# Patient Record
Sex: FEMALE | Race: WHITE | NOT HISPANIC OR LATINO
[De-identification: names, ages, dates, MRNs, and addresses within clinical notes are randomized per-mention and may not be internally consistent; named-entity substitution may affect disease eponyms.]

---

## 2018-07-17 ENCOUNTER — APPOINTMENT (OUTPATIENT)
Dept: SURGERY | Facility: CLINIC | Age: 63
End: 2018-07-17
Payer: SELF-PAY

## 2018-07-17 VITALS
BODY MASS INDEX: 58.29 KG/M2 | WEIGHT: 293 LBS | HEIGHT: 59.5 IN | SYSTOLIC BLOOD PRESSURE: 142 MMHG | DIASTOLIC BLOOD PRESSURE: 86 MMHG

## 2018-07-17 PROCEDURE — SI006: CPT

## 2018-08-17 ENCOUNTER — APPOINTMENT (OUTPATIENT)
Dept: SURGERY | Facility: CLINIC | Age: 63
End: 2018-08-17
Payer: MEDICARE

## 2018-08-17 VITALS
SYSTOLIC BLOOD PRESSURE: 138 MMHG | WEIGHT: 293 LBS | HEIGHT: 59.5 IN | BODY MASS INDEX: 58.29 KG/M2 | DIASTOLIC BLOOD PRESSURE: 84 MMHG

## 2018-08-17 DIAGNOSIS — Z80.41 FAMILY HISTORY OF MALIGNANT NEOPLASM OF OVARY: ICD-10-CM

## 2018-08-17 DIAGNOSIS — Z82.49 FAMILY HISTORY OF ISCHEMIC HEART DISEASE AND OTHER DISEASES OF THE CIRCULATORY SYSTEM: ICD-10-CM

## 2018-08-17 DIAGNOSIS — Z87.891 PERSONAL HISTORY OF NICOTINE DEPENDENCE: ICD-10-CM

## 2018-08-17 DIAGNOSIS — Z87.42 PERSONAL HISTORY OF OTHER DISEASES OF THE FEMALE GENITAL TRACT: ICD-10-CM

## 2018-08-17 DIAGNOSIS — Z87.01 PERSONAL HISTORY OF PNEUMONIA (RECURRENT): ICD-10-CM

## 2018-08-17 PROCEDURE — 99204 OFFICE O/P NEW MOD 45 MIN: CPT

## 2018-08-28 ENCOUNTER — APPOINTMENT (OUTPATIENT)
Dept: SURGERY | Facility: CLINIC | Age: 63
End: 2018-08-28
Payer: MEDICARE

## 2018-08-28 VITALS — WEIGHT: 293 LBS | BODY MASS INDEX: 58.29 KG/M2 | HEIGHT: 59.5 IN

## 2018-08-28 PROCEDURE — 97802 MEDICAL NUTRITION INDIV IN: CPT

## 2018-09-25 ENCOUNTER — APPOINTMENT (OUTPATIENT)
Dept: SURGERY | Facility: CLINIC | Age: 63
End: 2018-09-25
Payer: MEDICARE

## 2018-09-25 VITALS — BODY MASS INDEX: 58.17 KG/M2 | HEIGHT: 59.5 IN | WEIGHT: 292.4 LBS

## 2018-09-25 PROCEDURE — 99212 OFFICE O/P EST SF 10 MIN: CPT

## 2018-10-14 ENCOUNTER — FORM ENCOUNTER (OUTPATIENT)
Age: 63
End: 2018-10-14

## 2018-10-15 ENCOUNTER — OUTPATIENT (OUTPATIENT)
Dept: OUTPATIENT SERVICES | Facility: HOSPITAL | Age: 63
LOS: 1 days | Discharge: HOME | End: 2018-10-15

## 2018-10-15 DIAGNOSIS — E66.01 MORBID (SEVERE) OBESITY DUE TO EXCESS CALORIES: ICD-10-CM

## 2018-10-22 ENCOUNTER — RESULT REVIEW (OUTPATIENT)
Age: 63
End: 2018-10-22

## 2018-10-23 ENCOUNTER — APPOINTMENT (OUTPATIENT)
Dept: SURGERY | Facility: CLINIC | Age: 63
End: 2018-10-23
Payer: MEDICARE

## 2018-10-23 VITALS — BODY MASS INDEX: 58.09 KG/M2 | HEIGHT: 59.5 IN | WEIGHT: 292 LBS

## 2018-10-23 DIAGNOSIS — Z00.00 ENCOUNTER FOR GENERAL ADULT MEDICAL EXAMINATION W/OUT ABNORMAL FINDINGS: ICD-10-CM

## 2018-10-23 PROCEDURE — 99212 OFFICE O/P EST SF 10 MIN: CPT

## 2018-11-12 ENCOUNTER — RESULT REVIEW (OUTPATIENT)
Age: 63
End: 2018-11-12

## 2018-11-20 ENCOUNTER — APPOINTMENT (OUTPATIENT)
Dept: SURGERY | Facility: CLINIC | Age: 63
End: 2018-11-20
Payer: MEDICARE

## 2018-11-20 ENCOUNTER — OUTPATIENT (OUTPATIENT)
Dept: OUTPATIENT SERVICES | Facility: HOSPITAL | Age: 63
LOS: 1 days | Discharge: HOME | End: 2018-11-20

## 2018-11-20 VITALS — BODY MASS INDEX: 57.85 KG/M2 | WEIGHT: 290.8 LBS | HEIGHT: 59.5 IN

## 2018-11-20 DIAGNOSIS — E66.01 MORBID (SEVERE) OBESITY DUE TO EXCESS CALORIES: ICD-10-CM

## 2018-11-20 PROCEDURE — 99212 OFFICE O/P EST SF 10 MIN: CPT

## 2018-11-26 ENCOUNTER — OTHER (OUTPATIENT)
Age: 63
End: 2018-11-26

## 2018-12-18 ENCOUNTER — APPOINTMENT (OUTPATIENT)
Dept: SURGERY | Facility: CLINIC | Age: 63
End: 2018-12-18
Payer: MEDICARE

## 2018-12-18 VITALS — WEIGHT: 293 LBS | BODY MASS INDEX: 58.29 KG/M2 | HEIGHT: 59.5 IN

## 2018-12-18 PROCEDURE — 99212 OFFICE O/P EST SF 10 MIN: CPT

## 2019-01-24 ENCOUNTER — APPOINTMENT (OUTPATIENT)
Dept: SURGERY | Facility: CLINIC | Age: 64
End: 2019-01-24
Payer: MEDICARE

## 2019-01-24 VITALS — BODY MASS INDEX: 57.77 KG/M2 | WEIGHT: 290.4 LBS | HEIGHT: 59.5 IN

## 2019-01-24 PROCEDURE — 99212 OFFICE O/P EST SF 10 MIN: CPT

## 2019-02-02 ENCOUNTER — OUTPATIENT (OUTPATIENT)
Dept: OUTPATIENT SERVICES | Facility: HOSPITAL | Age: 64
LOS: 1 days | Discharge: HOME | End: 2019-02-02

## 2019-02-02 DIAGNOSIS — R91.8 OTHER NONSPECIFIC ABNORMAL FINDING OF LUNG FIELD: ICD-10-CM

## 2019-02-05 ENCOUNTER — APPOINTMENT (OUTPATIENT)
Dept: CARDIOLOGY | Facility: CLINIC | Age: 64
End: 2019-02-05

## 2019-02-05 ENCOUNTER — NON-APPOINTMENT (OUTPATIENT)
Age: 64
End: 2019-02-05

## 2019-02-05 VITALS
SYSTOLIC BLOOD PRESSURE: 142 MMHG | DIASTOLIC BLOOD PRESSURE: 84 MMHG | HEART RATE: 92 BPM | BODY MASS INDEX: 57.13 KG/M2 | WEIGHT: 291 LBS | HEIGHT: 60 IN

## 2019-02-18 ENCOUNTER — FORM ENCOUNTER (OUTPATIENT)
Age: 64
End: 2019-02-18

## 2019-02-19 ENCOUNTER — OUTPATIENT (OUTPATIENT)
Dept: OUTPATIENT SERVICES | Facility: HOSPITAL | Age: 64
LOS: 1 days | Discharge: HOME | End: 2019-02-19

## 2019-02-19 DIAGNOSIS — Z01.810 ENCOUNTER FOR PREPROCEDURAL CARDIOVASCULAR EXAMINATION: ICD-10-CM

## 2019-02-19 DIAGNOSIS — E66.01 MORBID (SEVERE) OBESITY DUE TO EXCESS CALORIES: ICD-10-CM

## 2019-02-19 DIAGNOSIS — E11.8 TYPE 2 DIABETES MELLITUS WITH UNSPECIFIED COMPLICATIONS: ICD-10-CM

## 2019-02-23 ENCOUNTER — APPOINTMENT (OUTPATIENT)
Dept: CARDIOLOGY | Facility: CLINIC | Age: 64
End: 2019-02-23

## 2019-03-03 NOTE — DISCUSSION/SUMMARY
[Procedure Intermediate Risk] : the procedure risk is intermediate [Additional Diagnostics Recommended] : additional diagnostics recommended [As per surgery] : as per surgery [Continue] : Continue medications as currently directed [FreeTextEntry1] : ECHO AND STRESS TEST

## 2019-03-03 NOTE — HISTORY OF PRESENT ILLNESS
[Preoperative Visit] : for a medical evaluation prior to surgery [Scheduled Procedure ___] : a [unfilled] [Surgeon Name ___] : surgeon: [unfilled] [Stable] : Stable [FreeTextEntry1] : Multiple cardiac risk factors\par \par +CONRAD with minimal activity. Denies any recent chest pain.\par \par Unable to assess metabolic capacity - Limited mobility due to extensive DJD and chronic left knee pain\par \par No prior cardiac workup

## 2019-03-03 NOTE — ADDENDUM
[FreeTextEntry1] : Echo and stress test reviewed - See scanned reports\par \par Low-risk for intermediate-risk surgery\par

## 2019-05-17 ENCOUNTER — APPOINTMENT (OUTPATIENT)
Dept: SURGERY | Facility: CLINIC | Age: 64
End: 2019-05-17
Payer: MEDICARE

## 2019-05-17 VITALS — HEIGHT: 59.5 IN | BODY MASS INDEX: 57.57 KG/M2 | WEIGHT: 289.4 LBS

## 2019-05-17 PROCEDURE — 99213 OFFICE O/P EST LOW 20 MIN: CPT

## 2019-05-20 RX ORDER — RABEPRAZOLE SODIUM 20 MG/1
20 TABLET, DELAYED RELEASE ORAL DAILY
Refills: 0 | Status: ACTIVE | COMMUNITY

## 2019-05-24 NOTE — ASSESSMENT
[FreeTextEntry1] : Patient seen for discussion of EGD results with pathologic confirmation of Junior's Esophagus.  As patient is desirous to have the Sleeve Gastrectomy, these endoscopic findings are concerning and in my opinion, a contraindication.  Issues surrounding Junior's and its potential progression as well as the risk of the Sleeve worsening reflux/Junior's was discussed with the patient and her . The option of a gastric bypass was discussed with the patient who is not interested in this choice at this time.  She was somewhat frustrated by the information and asked about seeing another GI physician for management of the Junior's.  As the patient is not an appropriate candidate for the Sleeve and the fact that she is not interested in the Gastric Bypass, we have no further plan at this time.  She will need time to consider the current options. Patient reports that she will also speak with the GI physician from whom she did not think the diagnosis would affect her surgical progress. The patient was obviously disappointed with this new information. Questions were answered.

## 2019-05-24 NOTE — REASON FOR VISIT
[Morbid Obesity (BMI>40)] : morbid obesity (bmi>40) [Follow-Up Visit] : a follow-up visit for [GERD] : gerd [Other: _____] : [unfilled] [FreeTextEntry2] : Junior's Esophagus

## 2019-06-20 ENCOUNTER — FORM ENCOUNTER (OUTPATIENT)
Age: 64
End: 2019-06-20

## 2019-06-21 ENCOUNTER — OUTPATIENT (OUTPATIENT)
Dept: OUTPATIENT SERVICES | Facility: HOSPITAL | Age: 64
LOS: 1 days | Discharge: HOME | End: 2019-06-21
Payer: MEDICARE

## 2019-06-21 DIAGNOSIS — E66.01 MORBID (SEVERE) OBESITY DUE TO EXCESS CALORIES: ICD-10-CM

## 2019-06-21 PROCEDURE — 78264 GASTRIC EMPTYING IMG STUDY: CPT | Mod: 26

## 2019-06-28 ENCOUNTER — OUTPATIENT (OUTPATIENT)
Dept: OUTPATIENT SERVICES | Facility: HOSPITAL | Age: 64
LOS: 1 days | Discharge: HOME | End: 2019-06-28
Payer: MEDICARE

## 2019-06-28 PROCEDURE — 74247: CPT | Mod: 26

## 2019-07-10 DIAGNOSIS — K31.84 GASTROPARESIS: ICD-10-CM

## 2019-07-10 DIAGNOSIS — E66.9 OBESITY, UNSPECIFIED: ICD-10-CM

## 2019-09-11 ENCOUNTER — APPOINTMENT (OUTPATIENT)
Dept: SURGERY | Facility: CLINIC | Age: 64
End: 2019-09-11
Payer: MEDICARE

## 2019-09-11 VITALS
SYSTOLIC BLOOD PRESSURE: 138 MMHG | BODY MASS INDEX: 57.34 KG/M2 | HEIGHT: 59.5 IN | WEIGHT: 288.2 LBS | DIASTOLIC BLOOD PRESSURE: 86 MMHG

## 2019-09-11 PROCEDURE — 99215 OFFICE O/P EST HI 40 MIN: CPT

## 2019-09-13 NOTE — HISTORY OF PRESENT ILLNESS
[de-identified] : Patient was diagnosed with Junior's esophagus and has decided to proceed with the Laparoscopic Gastric Bypass. The patient states that she has been overweight for several years and has tried multiple weight loss modalities in the past without any long-term sustainable weight loss.\par \par

## 2019-09-13 NOTE — REVIEW OF SYSTEMS
[Muscle Pain] : muscle pain [Joint Stiffness] : joint stiffness [Negative] : Allergic/Immunologic [FreeTextEntry9] : back

## 2019-09-13 NOTE — REASON FOR VISIT
[Follow-Up Visit] : a follow-up visit for [Morbid Obesity (BMI>40)] : morbid obesity (bmi>40) [FreeTextEntry2] : Patient presents for followup visit to discuss desirous surgical procedure.

## 2019-09-13 NOTE — ASSESSMENT
[FreeTextEntry1] : CARLENE SOLOMON is a 64 year female seen today for Bariatric follow up visit. She will be scheduled for the gastric bypass.  The surgical options for weight loss have been extensively discussed with the patient and questions answered.  Risks, including but not limited to:  anesthesia, death, bleeding, infection, leaks, blood clots, ulcers, malnutrition and need for additional operations have been reviewed.  \par \par

## 2019-09-13 NOTE — PLAN
[FreeTextEntry1] : PLAN:  Schedule surgery.\par             Liquid diet.\par             Call with concerns.

## 2019-09-16 ENCOUNTER — CLINICAL ADVICE (OUTPATIENT)
Age: 64
End: 2019-09-16

## 2019-09-20 ENCOUNTER — OTHER (OUTPATIENT)
Age: 64
End: 2019-09-20

## 2019-10-01 ENCOUNTER — EMERGENCY (EMERGENCY)
Facility: HOSPITAL | Age: 64
LOS: 0 days | Discharge: HOME | End: 2019-10-02
Attending: EMERGENCY MEDICINE | Admitting: EMERGENCY MEDICINE
Payer: MEDICARE

## 2019-10-01 ENCOUNTER — OUTPATIENT (OUTPATIENT)
Dept: OUTPATIENT SERVICES | Facility: HOSPITAL | Age: 64
LOS: 1 days | Discharge: HOME | End: 2019-10-01
Payer: COMMERCIAL

## 2019-10-01 VITALS
DIASTOLIC BLOOD PRESSURE: 81 MMHG | HEART RATE: 72 BPM | OXYGEN SATURATION: 99 % | SYSTOLIC BLOOD PRESSURE: 165 MMHG | RESPIRATION RATE: 18 BRPM | TEMPERATURE: 97 F

## 2019-10-01 VITALS
OXYGEN SATURATION: 96 % | DIASTOLIC BLOOD PRESSURE: 92 MMHG | TEMPERATURE: 96 F | HEIGHT: 60 IN | HEART RATE: 76 BPM | SYSTOLIC BLOOD PRESSURE: 142 MMHG | WEIGHT: 289.03 LBS | RESPIRATION RATE: 18 BRPM

## 2019-10-01 VITALS
RESPIRATION RATE: 18 BRPM | OXYGEN SATURATION: 98 % | HEART RATE: 89 BPM | SYSTOLIC BLOOD PRESSURE: 154 MMHG | TEMPERATURE: 98 F | DIASTOLIC BLOOD PRESSURE: 84 MMHG

## 2019-10-01 DIAGNOSIS — Z01.818 ENCOUNTER FOR OTHER PREPROCEDURAL EXAMINATION: ICD-10-CM

## 2019-10-01 DIAGNOSIS — E66.01 MORBID (SEVERE) OBESITY DUE TO EXCESS CALORIES: ICD-10-CM

## 2019-10-01 DIAGNOSIS — Z87.891 PERSONAL HISTORY OF NICOTINE DEPENDENCE: ICD-10-CM

## 2019-10-01 DIAGNOSIS — Z91.013 ALLERGY TO SEAFOOD: ICD-10-CM

## 2019-10-01 DIAGNOSIS — Z98.890 OTHER SPECIFIED POSTPROCEDURAL STATES: Chronic | ICD-10-CM

## 2019-10-01 DIAGNOSIS — Z90.710 ACQUIRED ABSENCE OF BOTH CERVIX AND UTERUS: Chronic | ICD-10-CM

## 2019-10-01 DIAGNOSIS — Z88.0 ALLERGY STATUS TO PENICILLIN: ICD-10-CM

## 2019-10-01 DIAGNOSIS — Z88.2 ALLERGY STATUS TO SULFONAMIDES: ICD-10-CM

## 2019-10-01 DIAGNOSIS — R79.9 ABNORMAL FINDING OF BLOOD CHEMISTRY, UNSPECIFIED: ICD-10-CM

## 2019-10-01 LAB
ALBUMIN SERPL ELPH-MCNC: 4 G/DL — SIGNIFICANT CHANGE UP (ref 3.5–5.2)
ALP SERPL-CCNC: 88 U/L — SIGNIFICANT CHANGE UP (ref 30–115)
ALT FLD-CCNC: 18 U/L — SIGNIFICANT CHANGE UP (ref 0–41)
ANION GAP SERPL CALC-SCNC: 16 MMOL/L — HIGH (ref 7–14)
APTT BLD: 36.1 SEC — SIGNIFICANT CHANGE UP (ref 27–39.2)
AST SERPL-CCNC: 17 U/L — SIGNIFICANT CHANGE UP (ref 0–41)
BASE EXCESS BLDV CALC-SCNC: 5.1 MMOL/L — HIGH (ref -2–2)
BASOPHILS # BLD AUTO: 0.04 K/UL — SIGNIFICANT CHANGE UP (ref 0–0.2)
BASOPHILS NFR BLD AUTO: 0.6 % — SIGNIFICANT CHANGE UP (ref 0–1)
BILIRUB SERPL-MCNC: 0.3 MG/DL — SIGNIFICANT CHANGE UP (ref 0.2–1.2)
BLD GP AB SCN SERPL QL: SIGNIFICANT CHANGE UP
BUN SERPL-MCNC: 14 MG/DL — SIGNIFICANT CHANGE UP (ref 10–20)
CA-I SERPL-SCNC: 1.15 MMOL/L — SIGNIFICANT CHANGE UP (ref 1.12–1.3)
CALCIUM SERPL-MCNC: 6.4 MG/DL — LOW (ref 8.5–10.1)
CHLORIDE SERPL-SCNC: 98 MMOL/L — SIGNIFICANT CHANGE UP (ref 98–110)
CO2 SERPL-SCNC: 26 MMOL/L — SIGNIFICANT CHANGE UP (ref 17–32)
CREAT SERPL-MCNC: 0.6 MG/DL — LOW (ref 0.7–1.5)
EOSINOPHIL # BLD AUTO: 0.16 K/UL — SIGNIFICANT CHANGE UP (ref 0–0.7)
EOSINOPHIL NFR BLD AUTO: 2.2 % — SIGNIFICANT CHANGE UP (ref 0–8)
ESTIMATED AVERAGE GLUCOSE: 123 MG/DL — HIGH (ref 68–114)
GAS PNL BLDV: 139 MMOL/L — SIGNIFICANT CHANGE UP (ref 136–145)
GAS PNL BLDV: SIGNIFICANT CHANGE UP
GLUCOSE SERPL-MCNC: 83 MG/DL — SIGNIFICANT CHANGE UP (ref 70–99)
HBA1C BLD-MCNC: 5.9 % — HIGH (ref 4–5.6)
HCO3 BLDV-SCNC: 30 MMOL/L — HIGH (ref 22–29)
HCT VFR BLD CALC: 36.7 % — LOW (ref 37–47)
HCT VFR BLDA CALC: 36.2 % — SIGNIFICANT CHANGE UP (ref 34–44)
HGB BLD CALC-MCNC: 11.8 G/DL — LOW (ref 14–18)
HGB BLD-MCNC: 11.5 G/DL — LOW (ref 12–16)
IMM GRANULOCYTES NFR BLD AUTO: 0.4 % — HIGH (ref 0.1–0.3)
INR BLD: 1 RATIO — SIGNIFICANT CHANGE UP (ref 0.65–1.3)
LACTATE BLDV-MCNC: 1.4 MMOL/L — SIGNIFICANT CHANGE UP (ref 0.5–1.6)
LYMPHOCYTES # BLD AUTO: 1.14 K/UL — LOW (ref 1.2–3.4)
LYMPHOCYTES # BLD AUTO: 15.8 % — LOW (ref 20.5–51.1)
MCHC RBC-ENTMCNC: 28.3 PG — SIGNIFICANT CHANGE UP (ref 27–31)
MCHC RBC-ENTMCNC: 31.3 G/DL — LOW (ref 32–37)
MCV RBC AUTO: 90.2 FL — SIGNIFICANT CHANGE UP (ref 81–99)
MONOCYTES # BLD AUTO: 0.38 K/UL — SIGNIFICANT CHANGE UP (ref 0.1–0.6)
MONOCYTES NFR BLD AUTO: 5.3 % — SIGNIFICANT CHANGE UP (ref 1.7–9.3)
NEUTROPHILS # BLD AUTO: 5.48 K/UL — SIGNIFICANT CHANGE UP (ref 1.4–6.5)
NEUTROPHILS NFR BLD AUTO: 75.7 % — HIGH (ref 42.2–75.2)
NRBC # BLD: 0 /100 WBCS — SIGNIFICANT CHANGE UP (ref 0–0)
PCO2 BLDV: 42 MMHG — SIGNIFICANT CHANGE UP (ref 41–51)
PH BLDV: 7.45 — HIGH (ref 7.26–7.43)
PLATELET # BLD AUTO: 202 K/UL — SIGNIFICANT CHANGE UP (ref 130–400)
PO2 BLDV: 79 MMHG — HIGH (ref 20–40)
POTASSIUM BLDV-SCNC: 4.3 MMOL/L — SIGNIFICANT CHANGE UP (ref 3.3–5.6)
POTASSIUM SERPL-MCNC: 6.4 MMOL/L — CRITICAL HIGH (ref 3.5–5)
POTASSIUM SERPL-SCNC: 6.4 MMOL/L — CRITICAL HIGH (ref 3.5–5)
PROT SERPL-MCNC: 7.2 G/DL — SIGNIFICANT CHANGE UP (ref 6–8)
PROTHROM AB SERPL-ACNC: 11.5 SEC — SIGNIFICANT CHANGE UP (ref 9.95–12.87)
RBC # BLD: 4.07 M/UL — LOW (ref 4.2–5.4)
RBC # FLD: 14.2 % — SIGNIFICANT CHANGE UP (ref 11.5–14.5)
SAO2 % BLDV: 97 % — SIGNIFICANT CHANGE UP
SODIUM SERPL-SCNC: 140 MMOL/L — SIGNIFICANT CHANGE UP (ref 135–146)
WBC # BLD: 7.23 K/UL — SIGNIFICANT CHANGE UP (ref 4.8–10.8)
WBC # FLD AUTO: 7.23 K/UL — SIGNIFICANT CHANGE UP (ref 4.8–10.8)

## 2019-10-01 PROCEDURE — 71046 X-RAY EXAM CHEST 2 VIEWS: CPT | Mod: 26

## 2019-10-01 PROCEDURE — 93010 ELECTROCARDIOGRAM REPORT: CPT

## 2019-10-01 PROCEDURE — 99284 EMERGENCY DEPT VISIT MOD MDM: CPT

## 2019-10-01 NOTE — ED PROVIDER NOTE - PATIENT PORTAL LINK FT
You can access the FollowMyHealth Patient Portal offered by [facility] by registering at the following website: [email or web address]. By joining [facility]’s FollowMyHealth portal, you will also be able to view your health information using other applications (apps) compatible with our system.

## 2019-10-01 NOTE — ED PROVIDER NOTE - CARE PLAN
Assessment and plan of treatment:	Likely hemolyzed K, no EKG changes from today, will get VBG and reassess Principal Discharge DX:	Abnormal laboratory test  Assessment and plan of treatment:	Likely hemolyzed K, no EKG changes from today, will get VBG and reassess

## 2019-10-01 NOTE — ED PROVIDER NOTE - NSFOLLOWUPINSTRUCTIONS_ED_ALL_ED_FT
YOU WERE SENT FOR REPEAT LABS AFTER YOUR POTASSIUM WAS FOUND TO BE ELEVATED. REPEAT BLOOD WORK WAS NORMAL. A COPY OF YOUR LABS WAS PROVIDED.

## 2019-10-01 NOTE — H&P PST ADULT - NSICDXPASTMEDICALHX_GEN_ALL_CORE_FT
PAST MEDICAL HISTORY:  Asthma     Barretts esophagus     DM (diabetes mellitus)     GERD (gastroesophageal reflux disease)     Obesity

## 2019-10-01 NOTE — ED ADULT NURSE NOTE - OBJECTIVE STATEMENT
Pt was seen earlier and D/C home around 4pm, but was contacted by her PMD at 9pm Dr Gil to come back for evaluation due to abnormal potassium level. Pt denies any pain ands states she feels fine.

## 2019-10-01 NOTE — ED ADULT NURSE NOTE - PMH
Asthma    Barretts esophagus    DM (diabetes mellitus)    GERD (gastroesophageal reflux disease)    Obesity

## 2019-10-01 NOTE — ED PROVIDER NOTE - OBJECTIVE STATEMENT
65 yo F, history of DM II, HLD, GERD, asthma, pending bariatric surgery here for assessment of hyperkalemia on pre-op testing. No complaints, has normal Cr, no signs of hyperkalemia on pre op EKG.

## 2019-10-01 NOTE — ED PROVIDER NOTE - PHYSICAL EXAMINATION
VITAL SIGNS: I have reviewed nursing notes and confirm.  CONSTITUTIONAL: Well-developed; well-nourished; in no acute distress.  SKIN: Skin exam is warm and dry, no acute rash.  HEAD: Normocephalic; atraumatic.  CARD: RRR, no murmur  RESP: No wheezes, rales or rhonchi.  ABD: Normal bowel sounds; soft; non-distended; non-tender  EXT: Normal ROM. No clubbing, cyanosis or edema.  NEURO: Alert, oriented. Grossly unremarkable. No focal deficits.  PSYCH: Cooperative, appropriate.

## 2019-10-02 PROBLEM — E11.9 TYPE 2 DIABETES MELLITUS WITHOUT COMPLICATIONS: Chronic | Status: ACTIVE | Noted: 2019-10-01

## 2019-10-02 PROBLEM — K22.70 BARRETT'S ESOPHAGUS WITHOUT DYSPLASIA: Chronic | Status: ACTIVE | Noted: 2019-10-01

## 2019-10-02 PROBLEM — K21.9 GASTRO-ESOPHAGEAL REFLUX DISEASE WITHOUT ESOPHAGITIS: Chronic | Status: ACTIVE | Noted: 2019-10-01

## 2019-10-02 PROBLEM — E66.9 OBESITY, UNSPECIFIED: Chronic | Status: ACTIVE | Noted: 2019-10-01

## 2019-10-02 PROBLEM — J45.909 UNSPECIFIED ASTHMA, UNCOMPLICATED: Chronic | Status: ACTIVE | Noted: 2019-10-01

## 2019-10-07 ENCOUNTER — CLINICAL ADVICE (OUTPATIENT)
Age: 64
End: 2019-10-07

## 2019-10-08 RX ORDER — SITAGLIPTIN 100 MG/1
100 TABLET, FILM COATED ORAL DAILY
Refills: 0 | Status: COMPLETED | COMMUNITY
End: 2019-10-08

## 2019-10-08 RX ORDER — VALSARTAN 40 MG/1
40 TABLET ORAL DAILY
Refills: 0 | Status: ACTIVE | COMMUNITY

## 2019-10-15 ENCOUNTER — APPOINTMENT (OUTPATIENT)
Dept: SURGERY | Facility: HOSPITAL | Age: 64
End: 2019-10-15
Payer: MEDICARE

## 2019-10-15 ENCOUNTER — INPATIENT (INPATIENT)
Facility: HOSPITAL | Age: 64
LOS: 1 days | Discharge: HOME | End: 2019-10-17
Attending: SURGERY | Admitting: SURGERY
Payer: MEDICARE

## 2019-10-15 VITALS
HEIGHT: 60 IN | HEART RATE: 100 BPM | DIASTOLIC BLOOD PRESSURE: 96 MMHG | RESPIRATION RATE: 20 BRPM | WEIGHT: 274.92 LBS | SYSTOLIC BLOOD PRESSURE: 138 MMHG | TEMPERATURE: 98 F

## 2019-10-15 DIAGNOSIS — Z98.890 OTHER SPECIFIED POSTPROCEDURAL STATES: Chronic | ICD-10-CM

## 2019-10-15 DIAGNOSIS — Z90.710 ACQUIRED ABSENCE OF BOTH CERVIX AND UTERUS: Chronic | ICD-10-CM

## 2019-10-15 LAB
ABO RH CONFIRMATION: SIGNIFICANT CHANGE UP
GLUCOSE BLDC GLUCOMTR-MCNC: 129 MG/DL — HIGH (ref 70–99)
GLUCOSE BLDC GLUCOMTR-MCNC: 156 MG/DL — HIGH (ref 70–99)
HCT VFR BLD CALC: 37.1 % — SIGNIFICANT CHANGE UP (ref 37–47)
HGB BLD-MCNC: 11.9 G/DL — LOW (ref 12–16)
MCHC RBC-ENTMCNC: 29 PG — SIGNIFICANT CHANGE UP (ref 27–31)
MCHC RBC-ENTMCNC: 32.1 G/DL — SIGNIFICANT CHANGE UP (ref 32–37)
MCV RBC AUTO: 90.5 FL — SIGNIFICANT CHANGE UP (ref 81–99)
NRBC # BLD: 0 /100 WBCS — SIGNIFICANT CHANGE UP (ref 0–0)
PLATELET # BLD AUTO: 207 K/UL — SIGNIFICANT CHANGE UP (ref 130–400)
RBC # BLD: 4.1 M/UL — LOW (ref 4.2–5.4)
RBC # FLD: 14.2 % — SIGNIFICANT CHANGE UP (ref 11.5–14.5)
WBC # BLD: 14.14 K/UL — HIGH (ref 4.8–10.8)
WBC # FLD AUTO: 14.14 K/UL — HIGH (ref 4.8–10.8)

## 2019-10-15 PROCEDURE — 43644 LAP GASTRIC BYPASS/ROUX-EN-Y: CPT | Mod: 82

## 2019-10-15 PROCEDURE — 99233 SBSQ HOSP IP/OBS HIGH 50: CPT

## 2019-10-15 PROCEDURE — 43644 LAP GASTRIC BYPASS/ROUX-EN-Y: CPT

## 2019-10-15 RX ORDER — LABETALOL HCL 100 MG
10 TABLET ORAL
Refills: 0 | Status: DISCONTINUED | OUTPATIENT
Start: 2019-10-15 | End: 2019-10-15

## 2019-10-15 RX ORDER — MORPHINE SULFATE 50 MG/1
30 CAPSULE, EXTENDED RELEASE ORAL
Refills: 0 | Status: DISCONTINUED | OUTPATIENT
Start: 2019-10-15 | End: 2019-10-17

## 2019-10-15 RX ORDER — ENOXAPARIN SODIUM 100 MG/ML
40 INJECTION SUBCUTANEOUS EVERY 12 HOURS
Refills: 0 | Status: DISCONTINUED | OUTPATIENT
Start: 2019-10-15 | End: 2019-10-17

## 2019-10-15 RX ORDER — SODIUM CHLORIDE 9 MG/ML
1000 INJECTION, SOLUTION INTRAVENOUS
Refills: 0 | Status: DISCONTINUED | OUTPATIENT
Start: 2019-10-15 | End: 2019-10-17

## 2019-10-15 RX ORDER — ONDANSETRON 8 MG/1
4 TABLET, FILM COATED ORAL EVERY 6 HOURS
Refills: 0 | Status: DISCONTINUED | OUTPATIENT
Start: 2019-10-15 | End: 2019-10-17

## 2019-10-15 RX ORDER — NALOXONE HYDROCHLORIDE 4 MG/.1ML
0.1 SPRAY NASAL
Refills: 0 | Status: DISCONTINUED | OUTPATIENT
Start: 2019-10-15 | End: 2019-10-17

## 2019-10-15 RX ORDER — ACETAMINOPHEN 500 MG
1000 TABLET ORAL ONCE
Refills: 0 | Status: DISCONTINUED | OUTPATIENT
Start: 2019-10-15 | End: 2019-10-16

## 2019-10-15 RX ORDER — MORPHINE SULFATE 50 MG/1
4 CAPSULE, EXTENDED RELEASE ORAL
Refills: 0 | Status: DISCONTINUED | OUTPATIENT
Start: 2019-10-15 | End: 2019-10-15

## 2019-10-15 RX ORDER — PANTOPRAZOLE SODIUM 20 MG/1
40 TABLET, DELAYED RELEASE ORAL DAILY
Refills: 0 | Status: DISCONTINUED | OUTPATIENT
Start: 2019-10-15 | End: 2019-10-17

## 2019-10-15 RX ORDER — DEXTROSE 50 % IN WATER 50 %
25 SYRINGE (ML) INTRAVENOUS ONCE
Refills: 0 | Status: DISCONTINUED | OUTPATIENT
Start: 2019-10-15 | End: 2019-10-17

## 2019-10-15 RX ORDER — GABAPENTIN 400 MG/1
125 CAPSULE ORAL EVERY 8 HOURS
Refills: 0 | Status: DISCONTINUED | OUTPATIENT
Start: 2019-10-15 | End: 2019-10-17

## 2019-10-15 RX ORDER — DEXTROSE 50 % IN WATER 50 %
12.5 SYRINGE (ML) INTRAVENOUS ONCE
Refills: 0 | Status: DISCONTINUED | OUTPATIENT
Start: 2019-10-15 | End: 2019-10-17

## 2019-10-15 RX ORDER — ACETAMINOPHEN 500 MG
1000 TABLET ORAL ONCE
Refills: 0 | Status: COMPLETED | OUTPATIENT
Start: 2019-10-15 | End: 2019-10-15

## 2019-10-15 RX ORDER — BUPIVACAINE 13.3 MG/ML
20 INJECTION, SUSPENSION, LIPOSOMAL INFILTRATION ONCE
Refills: 0 | Status: COMPLETED | OUTPATIENT
Start: 2019-10-15 | End: 2019-10-15

## 2019-10-15 RX ORDER — GLUCAGON INJECTION, SOLUTION 0.5 MG/.1ML
1 INJECTION, SOLUTION SUBCUTANEOUS ONCE
Refills: 0 | Status: DISCONTINUED | OUTPATIENT
Start: 2019-10-15 | End: 2019-10-17

## 2019-10-15 RX ORDER — HEPARIN SODIUM 5000 [USP'U]/ML
5000 INJECTION INTRAVENOUS; SUBCUTANEOUS ONCE
Refills: 0 | Status: COMPLETED | OUTPATIENT
Start: 2019-10-15 | End: 2019-10-15

## 2019-10-15 RX ORDER — INSULIN LISPRO 100/ML
VIAL (ML) SUBCUTANEOUS
Refills: 0 | Status: DISCONTINUED | OUTPATIENT
Start: 2019-10-15 | End: 2019-10-17

## 2019-10-15 RX ORDER — LABETALOL HCL 100 MG
10 TABLET ORAL ONCE
Refills: 0 | Status: DISCONTINUED | OUTPATIENT
Start: 2019-10-15 | End: 2019-10-15

## 2019-10-15 RX ORDER — MORPHINE SULFATE 50 MG/1
30 CAPSULE, EXTENDED RELEASE ORAL
Refills: 0 | Status: DISCONTINUED | OUTPATIENT
Start: 2019-10-15 | End: 2019-10-15

## 2019-10-15 RX ORDER — LABETALOL HCL 100 MG
10 TABLET ORAL ONCE
Refills: 0 | Status: COMPLETED | OUTPATIENT
Start: 2019-10-15 | End: 2019-10-15

## 2019-10-15 RX ORDER — DEXTROSE 50 % IN WATER 50 %
15 SYRINGE (ML) INTRAVENOUS ONCE
Refills: 0 | Status: DISCONTINUED | OUTPATIENT
Start: 2019-10-15 | End: 2019-10-17

## 2019-10-15 RX ORDER — SODIUM CHLORIDE 9 MG/ML
1000 INJECTION, SOLUTION INTRAVENOUS
Refills: 0 | Status: DISCONTINUED | OUTPATIENT
Start: 2019-10-15 | End: 2019-10-16

## 2019-10-15 RX ORDER — INSULIN DETEMIR 100/ML (3)
50 INSULIN PEN (ML) SUBCUTANEOUS
Qty: 0 | Refills: 0 | DISCHARGE

## 2019-10-15 RX ORDER — INSULIN LISPRO 100/ML
VIAL (ML) SUBCUTANEOUS
Refills: 0 | Status: DISCONTINUED | OUTPATIENT
Start: 2019-10-15 | End: 2019-10-15

## 2019-10-15 RX ORDER — HYDROMORPHONE HYDROCHLORIDE 2 MG/ML
0.5 INJECTION INTRAMUSCULAR; INTRAVENOUS; SUBCUTANEOUS
Refills: 0 | Status: DISCONTINUED | OUTPATIENT
Start: 2019-10-15 | End: 2019-10-15

## 2019-10-15 RX ORDER — KETOROLAC TROMETHAMINE 30 MG/ML
15 SYRINGE (ML) INJECTION EVERY 6 HOURS
Refills: 0 | Status: DISCONTINUED | OUTPATIENT
Start: 2019-10-15 | End: 2019-10-16

## 2019-10-15 RX ADMIN — Medication 1.25 MILLIGRAM(S): at 18:56

## 2019-10-15 RX ADMIN — PANTOPRAZOLE SODIUM 40 MILLIGRAM(S): 20 TABLET, DELAYED RELEASE ORAL at 19:38

## 2019-10-15 RX ADMIN — Medication 2: at 19:30

## 2019-10-15 RX ADMIN — Medication 400 MILLIGRAM(S): at 20:00

## 2019-10-15 RX ADMIN — Medication 15 MILLIGRAM(S): at 19:30

## 2019-10-15 RX ADMIN — HEPARIN SODIUM 5000 UNIT(S): 5000 INJECTION INTRAVENOUS; SUBCUTANEOUS at 12:38

## 2019-10-15 RX ADMIN — Medication 10 MILLIGRAM(S): at 20:18

## 2019-10-15 RX ADMIN — GABAPENTIN 125 MILLIGRAM(S): 400 CAPSULE ORAL at 22:41

## 2019-10-15 RX ADMIN — SODIUM CHLORIDE 125 MILLILITER(S): 9 INJECTION, SOLUTION INTRAVENOUS at 17:00

## 2019-10-15 RX ADMIN — MORPHINE SULFATE 30 MILLILITER(S): 50 CAPSULE, EXTENDED RELEASE ORAL at 17:43

## 2019-10-15 RX ADMIN — Medication 15 MILLIGRAM(S): at 19:45

## 2019-10-15 RX ADMIN — MORPHINE SULFATE 4 MILLIGRAM(S): 50 CAPSULE, EXTENDED RELEASE ORAL at 17:04

## 2019-10-15 RX ADMIN — ENOXAPARIN SODIUM 40 MILLIGRAM(S): 100 INJECTION SUBCUTANEOUS at 18:56

## 2019-10-15 NOTE — CHART NOTE - NSCHARTNOTEFT_GEN_A_CORE
SURGERY POST-OP NOTE:    S: Patient underwent Laparoscopic gastric bypass and tolerated procedure without issue and sent to PACU. Patient denies chest pain, shortness of breath, nausea, vomiting, lightheadedness, or dizziness. Pain was well controlled.      · Estimated Blood Loss	20 milliLiter(s)  · IV Infusions - Crystalloids	2100cc  · Urine Output	200 milliLiter(s)    O:  T(C): 36.7 (10-15-19 @ 19:00), Max: 36.7 (10-15-19 @ 19:00)  HR: 92 (10-15-19 @ 20:00) (68 - 92)  BP: 178/82 (10-15-19 @ 20:00) (154/88 - 181/92)  RR: 16 (10-15-19 @ 20:00) (14 - 22)  SpO2: 100% (10-15-19 @ 20:00) (95% - 100%)  Wt(kg): --         Gen: NAD  Resp: Non-labored respirations  Abd: Obese, soft, nondistended. Tenderness appropriate post-op  Incisions: clean dry and intact, no signs of bleeding or drainage   : Cain draining yellow urine    Assessment/Plan:  64y Female now POD#0 s/p Laparoscopic gastric bypass    - Pain control  - NPO  - Hemodynamic monitoring  - DVT PPX  - Early ambulation  - Incentive spirometry    Dispo: SICU

## 2019-10-15 NOTE — BRIEF OPERATIVE NOTE - BRIEF OP NOTE DRAINS
none

## 2019-10-15 NOTE — CHART NOTE - NSCHARTNOTEFT_GEN_A_CORE
PACU ANESTHESIA ADMISSION NOTE      Procedure: Laparoscopic gastric bypass    Post op diagnosis:  Morbid obesity      ____  Intubated  TV:______       Rate: ______      FiO2: ______    _x___  Patent Airway    _x___  Full return of protective reflexes    _x___  Full recovery from anesthesia / back to baseline status    Vitals:  T(F): 97.6   HR: 89  BP: 160/80  RR: 19  SpO2: 97%    Mental Status:  _x___ Awake   __x___ Alert   _____ Drowsy   _____ Sedated    Nausea/Vomiting:  _x___  NO       ______Yes,   See Post - Op Orders         Pain Scale (0-10):  __0___    Treatment: _x___ None    ____ See Post - Op/PCA Orders    Post - Operative Fluids:   __x__ Oral   ____ See Post - Op Orders    Plan: Discharge:   ___Home       _____Floor     _x____Critical Care    _____  Other:_________________    Comments:  No anesthesia issues or complications noted.  Discharge when criteria met.

## 2019-10-15 NOTE — CONSULT NOTE ADULT - SUBJECTIVE AND OBJECTIVE BOX
SICU Consultation Note  =====================================================  HPI: 64y Female w/ a PMH of DM, asthma, JESSICA, GERD, landis’s esophagus, and obesity w/ BMI 59.1 s/p elective RNY GBP with tap block today.     Uncomplicated procedure  Surgery Information  OR time: 2h     EBL: 20cc       IV Fluids:  2100     Blood Products: None  UOP:  200      PAST MEDICAL & SURGICAL HISTORY:  Barretts esophagus  GERD (gastroesophageal reflux disease)  Asthma  DM (diabetes mellitus)  Obesity  History of surgery: left knee  History of hysterectomy    Home Meds: Home Medications:  Aspir 81 oral delayed release tablet: 4 tab(s) orally once a day (15 Oct 2019 08:35)  Januvia 100 mg oral tablet: 1 tab(s) orally once a day (15 Oct 2019 08:35)  Levemir FlexPen 100 units/mL subcutaneous solution: 40 unit(s) subcutaneous once a day (at bedtime). 20 units 10/14/19 (15 Oct 2019 08:35)  metFORMIN 1000 mg oral tablet: 1 tab(s) orally 2 times a day (15 Oct 2019 08:35)  montelukast 10 mg oral tablet: 1 tab(s) orally once a day (15 Oct 2019 08:35)  Multiple Vitamins oral tablet: 1 tab(s) orally once a day (15 Oct 2019 08:35)  RABEprazole 20 mg oral delayed release tablet: 1 tab(s) orally once a day (15 Oct 2019 08:35)  simvastatin 10 mg oral tablet: 1 tab(s) orally once a day (at bedtime) (15 Oct 2019 08:35)  Vitamin B12:  (15 Oct 2019 08:35)  Vitamin D3:  (15 Oct 2019 08:35)    Allergies: Allergies    iodine (Rash)  penicillins (Anaphylaxis; Rash)  Seafood (Rash)  sulfa drugs (Anaphylaxis; Rash)    Intolerances      Soc:   Advanced Directives: Full Code     ROS:    REVIEW OF SYSTEMS    [x] A ten-point review of systems was otherwise negative except as noted.  [ ] Due to altered mental status/intubation, subjective information were not able to be obtained from the patient. History was obtained, to the extent possible, from review of the chart and collateral sources of information.      CURRENT MEDICATIONS:   --------------------------------------------------------------------------------------  Neurologic Medications  acetaminophen  IVPB .. 1000 milliGRAM(s) IV Intermittent once  acetaminophen  IVPB .. 1000 milliGRAM(s) IV Intermittent once  HYDROmorphone  Injectable 0.5 milliGRAM(s) IV Push every 10 minutes PRN Moderate Pain (4 - 6)  ketorolac   Injectable 15 milliGRAM(s) IV Push every 6 hours  morphine  - Injectable 4 milliGRAM(s) IV Push every 10 minutes PRN Severe Pain (7 - 10)  morphine PCA (5 mG/mL) 30 milliLiter(s) PCA Continuous PCA Continuous  ondansetron Injectable 4 milliGRAM(s) IV Push every 6 hours PRN Nausea  ondansetron Injectable 4 milliGRAM(s) IV Push every 6 hours PRN Nausea and/or Vomiting    Respiratory Medications    Cardiovascular Medications    Gastrointestinal Medications  dextrose 5%. 1000 milliLiter(s) IV Continuous <Continuous>  lactated ringers. 1000 milliLiter(s) IV Continuous <Continuous>  lactated ringers. 1000 milliLiter(s) IV Continuous <Continuous>  pantoprazole  Injectable 40 milliGRAM(s) IV Push daily    Genitourinary Medications    Hematologic/Oncologic Medications  enoxaparin Injectable 40 milliGRAM(s) SubCutaneous every 12 hours    Antimicrobial/Immunologic Medications    Endocrine/Metabolic Medications  dextrose 40% Gel 15 Gram(s) Oral once PRN Blood Glucose LESS THAN 70 milliGRAM(s)/deciliter  dextrose 50% Injectable 12.5 Gram(s) IV Push once  dextrose 50% Injectable 25 Gram(s) IV Push once  dextrose 50% Injectable 25 Gram(s) IV Push once  glucagon  Injectable 1 milliGRAM(s) IntraMuscular once PRN Glucose LESS THAN 70 milligrams/deciliter  insulin lispro (HumaLOG) corrective regimen sliding scale   SubCutaneous three times a day before meals    Topical/Other Medications  BUpivacaine liposome 1.3% Injectable 20 milliLiter(s) Local Injection once  naloxone Injectable 0.1 milliGRAM(s) IV Push every 3 minutes PRN For ANY of the following changes in patient status:  A. RR LESS THAN 10 breaths per minute, B. Oxygen saturation LESS THAN 90%, C. Sedation score of 6    --------------------------------------------------------------------------------------    VITAL SIGNS, INS/OUTS (last 24 hours):  --------------------------------------------------------------------------------------  ICU Vital Signs Last 24 Hrs  T(C): 36.4 (15 Oct 2019 16:13), Max: 36.5 (15 Oct 2019 08:37)  T(F): 97.6 (15 Oct 2019 16:13), Max: 97.7 (15 Oct 2019 08:37)  HR: 79 (15 Oct 2019 16:28) (78 - 100)  BP: 170/91 (15 Oct 2019 16:28) (138/96 - 170/91)  BP(mean): --  ABP: --  ABP(mean): --  RR: 15 (15 Oct 2019 16:28) (14 - 20)  SpO2: 98% (15 Oct 2019 16:28) (95% - 98%)    I&O's Summary    --------------------------------------------------------------------------------------    EXAM:  General/Neuro  RASS:   GCS:   Exam: Normal, NAD, alert, oriented x 3, no focal deficits. PERRLA  ***    Respiratory  Exam: Lungs clear to auscultation, Normal expansion/effort.  ***  [] Tracheostomy   [] Intubated  Mechanical Ventilation:     Cardiovascular  Exam: S1, S2.  Regular rate and rhythm.  Peripheral edema  ***  Cardiac Rhythm: Normal Sinus Rhythm  ECHO:     GI  Exam: Abdomen soft, Non-tender, Non-distended.  Gastrostomy / Jejunostomy tube in place.  Nasogastric tube in place.  Colostomy / Ileostomy.  ***  Wound:   ***  Current Diet:  NPO***      Tubes/Lines/Drains  ***  [x] Peripheral IV  [] Central Venous Line     	[] R	[] L	[] IJ	[] Fem	[] SC        Type:	    Date Placed:   [] Arterial Line		[] R	[] L	[] Fem	[] Rad	[] Ax	Date Placed:   [] PICC:         	[] Midline		[] Mediport           [] Urinary Catheter		Date Placed:     Extremities  Exam: Extremities warm, pink, well-perfused.        Derm:  Exam: Good skin turgor, no skin breakdown.      :   Exam: Cain catheter in place.     LABS  --------------------------------------------------------------------------------------  Labs:  CAPILLARY BLOOD GLUCOSE      POCT Blood Glucose.: 129 mg/dL (15 Oct 2019 08:31)                  LFTs:         Coags:                  --------------------------------------------------------------------------------------    OTHER LABS    IMAGING RESULTS      ASSESSMENT:  64y Female ***    PLAN:   Neurologic:   Respiratory:   Cardiovascular:   Gastrointestinal/Nutrition:   Renal/Genitourinary:   Hematologic:   Infectious Disease:   Lines/Tubes:  Endocrine:   Disposition:     --------------------------------------------------------------------------------------    Critical Care Diagnoses: SICU Consultation Note  =====================================================  HPI: 64y Female w/ a PMH of DM, asthma, JESSICA, GERD, landis’s esophagus, and obesity w/ BMI 59.1 s/p elective RNY GBP with tap block today.     Uncomplicated procedure  Surgery Information  OR time: 2h     EBL: 20cc       IV Fluids:  2100     Blood Products: None  UOP:  200    Postoperatively, pt c/o mild b/l UQ pain, sore in character, assoc w/ nausea, no vomiting. Pt denies CP, SOB, fever, chills, urinary symptoms.      PAST MEDICAL & SURGICAL HISTORY:  Barretts esophagus  GERD (gastroesophageal reflux disease)  Asthma  DM (diabetes mellitus)  Obesity  History of surgery: left knee  History of hysterectomy    Home Meds: Home Medications:  Aspir 81 oral delayed release tablet: 4 tab(s) orally once a day (15 Oct 2019 08:35)  Januvia 100 mg oral tablet: 1 tab(s) orally once a day (15 Oct 2019 08:35)  Levemir FlexPen 100 units/mL subcutaneous solution: 40 unit(s) subcutaneous once a day (at bedtime). 20 units 10/14/19 (15 Oct 2019 08:35)  metFORMIN 1000 mg oral tablet: 1 tab(s) orally 2 times a day (15 Oct 2019 08:35)  montelukast 10 mg oral tablet: 1 tab(s) orally once a day (15 Oct 2019 08:35)  Multiple Vitamins oral tablet: 1 tab(s) orally once a day (15 Oct 2019 08:35)  RABEprazole 20 mg oral delayed release tablet: 1 tab(s) orally once a day (15 Oct 2019 08:35)  simvastatin 10 mg oral tablet: 1 tab(s) orally once a day (at bedtime) (15 Oct 2019 08:35)  Vitamin B12:  (15 Oct 2019 08:35)  Vitamin D3:  (15 Oct 2019 08:35)    Allergies: Allergies    iodine (Rash)  penicillins (Anaphylaxis; Rash)  Seafood (Rash)  sulfa drugs (Anaphylaxis; Rash)    Intolerances      Soc:   Advanced Directives: Full Code     ROS:    REVIEW OF SYSTEMS    [x] A ten-point review of systems was otherwise negative except as noted.  [ ] Due to altered mental status/intubation, subjective information were not able to be obtained from the patient. History was obtained, to the extent possible, from review of the chart and collateral sources of information.      CURRENT MEDICATIONS:   --------------------------------------------------------------------------------------  Neurologic Medications  acetaminophen  IVPB .. 1000 milliGRAM(s) IV Intermittent once  acetaminophen  IVPB .. 1000 milliGRAM(s) IV Intermittent once  HYDROmorphone  Injectable 0.5 milliGRAM(s) IV Push every 10 minutes PRN Moderate Pain (4 - 6)  ketorolac   Injectable 15 milliGRAM(s) IV Push every 6 hours  morphine  - Injectable 4 milliGRAM(s) IV Push every 10 minutes PRN Severe Pain (7 - 10)  morphine PCA (5 mG/mL) 30 milliLiter(s) PCA Continuous PCA Continuous  ondansetron Injectable 4 milliGRAM(s) IV Push every 6 hours PRN Nausea  ondansetron Injectable 4 milliGRAM(s) IV Push every 6 hours PRN Nausea and/or Vomiting    Respiratory Medications    Cardiovascular Medications    Gastrointestinal Medications  dextrose 5%. 1000 milliLiter(s) IV Continuous <Continuous>  lactated ringers. 1000 milliLiter(s) IV Continuous <Continuous>  lactated ringers. 1000 milliLiter(s) IV Continuous <Continuous>  pantoprazole  Injectable 40 milliGRAM(s) IV Push daily    Genitourinary Medications    Hematologic/Oncologic Medications  enoxaparin Injectable 40 milliGRAM(s) SubCutaneous every 12 hours    Antimicrobial/Immunologic Medications    Endocrine/Metabolic Medications  dextrose 40% Gel 15 Gram(s) Oral once PRN Blood Glucose LESS THAN 70 milliGRAM(s)/deciliter  dextrose 50% Injectable 12.5 Gram(s) IV Push once  dextrose 50% Injectable 25 Gram(s) IV Push once  dextrose 50% Injectable 25 Gram(s) IV Push once  glucagon  Injectable 1 milliGRAM(s) IntraMuscular once PRN Glucose LESS THAN 70 milligrams/deciliter  insulin lispro (HumaLOG) corrective regimen sliding scale   SubCutaneous three times a day before meals    Topical/Other Medications  BUpivacaine liposome 1.3% Injectable 20 milliLiter(s) Local Injection once  naloxone Injectable 0.1 milliGRAM(s) IV Push every 3 minutes PRN For ANY of the following changes in patient status:  A. RR LESS THAN 10 breaths per minute, B. Oxygen saturation LESS THAN 90%, C. Sedation score of 6    --------------------------------------------------------------------------------------    VITAL SIGNS, INS/OUTS (last 24 hours):  --------------------------------------------------------------------------------------  ICU Vital Signs Last 24 Hrs  T(C): 36.4 (15 Oct 2019 16:13), Max: 36.5 (15 Oct 2019 08:37)  T(F): 97.6 (15 Oct 2019 16:13), Max: 97.7 (15 Oct 2019 08:37)  HR: 79 (15 Oct 2019 16:28) (78 - 100)  BP: 170/91 (15 Oct 2019 16:28) (138/96 - 170/91)  BP(mean): --  ABP: --  ABP(mean): --  RR: 15 (15 Oct 2019 16:28) (14 - 20)  SpO2: 98% (15 Oct 2019 16:28) (95% - 98%)    I&O's Summary    --------------------------------------------------------------------------------------    EXAM:  General/Neuro  Exam: Normal, NAD, lethargic, but alert & oriented x 3, no focal deficits. PERRLA, EOMI  Pain controlled    Respiratory  Exam: Lungs clear to auscultation, Normal expansion/effort.    Cardiovascular  Exam: S1, S2.  Regular rate and rhythm.    Cardiac Rhythm: Normal Sinus Rhythm    GI  Exam: Abdomen soft, Non-tender, Non-distended.  Obese  Incisions c/d/i  Current Diet:  NPO      Tubes/Lines/Drains   [x] Peripheral IV  [] Central Venous Line     	[] R	[] L	[] IJ	[] Fem	[] SC        Type:	    Date Placed:   [] Arterial Line		[] R	[] L	[] Fem	[] Rad	[] Ax	Date Placed:   [] PICC:         	[] Midline		[] Mediport           [] Urinary Catheter		Date Placed:     Extremities  Exam: Extremities warm, pink, well-perfused.      Derm:  Exam: Good skin turgor, no skin breakdown.      :   Exam: Cain catheter in place.     LABS  --------------------------------------------------------------------------------------  Labs:  CAPILLARY BLOOD GLUCOSE    POCT Blood Glucose.: 129 mg/dL (15 Oct 2019 08:31)    Full set of labs @ 23:30      --------------------------------------------------------------------------------------  IMAGING RESULTS    None at present

## 2019-10-15 NOTE — PRE-ANESTHESIA EVALUATION ADULT - NSANTHAPLANRD_GEN_ALL_CORE
Gen: No fever, normal appetite  Eyes: No eye irritation or discharge  ENT: No ear pain, congestion, sore throat  Resp: No cough or trouble breathing  Cardiovascular: No chest pain or palpitation  Gastroenteric: No nausea/vomiting, diarrhea, constipation  :  No change in urine output; no dysuria  MS: No joint or muscle pain  Skin: Left thumb abscess   Neuro: No headache; no abnormal movements  Remainder negative, except as per the HPI general

## 2019-10-16 LAB
ANION GAP SERPL CALC-SCNC: 13 MMOL/L — SIGNIFICANT CHANGE UP (ref 7–14)
ANION GAP SERPL CALC-SCNC: 17 MMOL/L — HIGH (ref 7–14)
BUN SERPL-MCNC: 11 MG/DL — SIGNIFICANT CHANGE UP (ref 10–20)
BUN SERPL-MCNC: 18 MG/DL — SIGNIFICANT CHANGE UP (ref 10–20)
CALCIUM SERPL-MCNC: 8.3 MG/DL — LOW (ref 8.5–10.1)
CALCIUM SERPL-MCNC: 8.4 MG/DL — LOW (ref 8.5–10.1)
CHLORIDE SERPL-SCNC: 100 MMOL/L — SIGNIFICANT CHANGE UP (ref 98–110)
CHLORIDE SERPL-SCNC: 97 MMOL/L — LOW (ref 98–110)
CO2 SERPL-SCNC: 21 MMOL/L — SIGNIFICANT CHANGE UP (ref 17–32)
CO2 SERPL-SCNC: 26 MMOL/L — SIGNIFICANT CHANGE UP (ref 17–32)
CREAT SERPL-MCNC: 0.7 MG/DL — SIGNIFICANT CHANGE UP (ref 0.7–1.5)
CREAT SERPL-MCNC: 0.8 MG/DL — SIGNIFICANT CHANGE UP (ref 0.7–1.5)
GLUCOSE BLDC GLUCOMTR-MCNC: 134 MG/DL — HIGH (ref 70–99)
GLUCOSE BLDC GLUCOMTR-MCNC: 140 MG/DL — HIGH (ref 70–99)
GLUCOSE BLDC GLUCOMTR-MCNC: 146 MG/DL — HIGH (ref 70–99)
GLUCOSE BLDC GLUCOMTR-MCNC: 146 MG/DL — HIGH (ref 70–99)
GLUCOSE SERPL-MCNC: 143 MG/DL — HIGH (ref 70–99)
GLUCOSE SERPL-MCNC: 161 MG/DL — HIGH (ref 70–99)
MAGNESIUM SERPL-MCNC: 1.9 MG/DL — SIGNIFICANT CHANGE UP (ref 1.8–2.4)
MAGNESIUM SERPL-MCNC: 2 MG/DL — SIGNIFICANT CHANGE UP (ref 1.8–2.4)
PHOSPHATE SERPL-MCNC: 2.4 MG/DL — SIGNIFICANT CHANGE UP (ref 2.1–4.9)
PHOSPHATE SERPL-MCNC: 4.2 MG/DL — SIGNIFICANT CHANGE UP (ref 2.1–4.9)
POTASSIUM SERPL-MCNC: 4.7 MMOL/L — SIGNIFICANT CHANGE UP (ref 3.5–5)
POTASSIUM SERPL-MCNC: 5.1 MMOL/L — HIGH (ref 3.5–5)
POTASSIUM SERPL-SCNC: 4.7 MMOL/L — SIGNIFICANT CHANGE UP (ref 3.5–5)
POTASSIUM SERPL-SCNC: 5.1 MMOL/L — HIGH (ref 3.5–5)
SODIUM SERPL-SCNC: 135 MMOL/L — SIGNIFICANT CHANGE UP (ref 135–146)
SODIUM SERPL-SCNC: 139 MMOL/L — SIGNIFICANT CHANGE UP (ref 135–146)

## 2019-10-16 PROCEDURE — 93010 ELECTROCARDIOGRAM REPORT: CPT

## 2019-10-16 RX ORDER — INFLUENZA VIRUS VACCINE 15; 15; 15; 15 UG/.5ML; UG/.5ML; UG/.5ML; UG/.5ML
0.5 SUSPENSION INTRAMUSCULAR ONCE
Refills: 0 | Status: DISCONTINUED | OUTPATIENT
Start: 2019-10-16 | End: 2019-10-17

## 2019-10-16 RX ORDER — MAGNESIUM SULFATE 500 MG/ML
1 VIAL (ML) INJECTION ONCE
Refills: 0 | Status: COMPLETED | OUTPATIENT
Start: 2019-10-16 | End: 2019-10-16

## 2019-10-16 RX ADMIN — Medication 15 MILLIGRAM(S): at 01:50

## 2019-10-16 RX ADMIN — ENOXAPARIN SODIUM 40 MILLIGRAM(S): 100 INJECTION SUBCUTANEOUS at 05:38

## 2019-10-16 RX ADMIN — PANTOPRAZOLE SODIUM 40 MILLIGRAM(S): 20 TABLET, DELAYED RELEASE ORAL at 12:22

## 2019-10-16 RX ADMIN — Medication 15 MILLIGRAM(S): at 02:05

## 2019-10-16 RX ADMIN — GABAPENTIN 125 MILLIGRAM(S): 400 CAPSULE ORAL at 14:34

## 2019-10-16 RX ADMIN — ENOXAPARIN SODIUM 40 MILLIGRAM(S): 100 INJECTION SUBCUTANEOUS at 17:43

## 2019-10-16 RX ADMIN — GABAPENTIN 125 MILLIGRAM(S): 400 CAPSULE ORAL at 05:38

## 2019-10-16 RX ADMIN — GABAPENTIN 125 MILLIGRAM(S): 400 CAPSULE ORAL at 22:01

## 2019-10-16 RX ADMIN — Medication 50 GRAM(S): at 01:50

## 2019-10-16 NOTE — OCCUPATIONAL THERAPY INITIAL EVALUATION ADULT - LIVES WITH, PROFILE
spouse/pvt home, +6 VIET +HR, +1 flight of stairs in home to bedroom, +bathroom on both levels, +bathtub on 2nd story

## 2019-10-16 NOTE — CHART NOTE - NSCHARTNOTEFT_GEN_A_CORE
63 YO F s/p lap RYGB - POD #1.  Tolerating dominic clear liquid diet well at 1 oz/hr.  Has protein shakes and chewable vits/mins at home.  Patient verbalized understanding of phase I diet to begin upon discharge.  DROP sheet reviewed with patient and all questions were answered.  Will follow up in office next week.  Call [phone] with questions/concerns.

## 2019-10-16 NOTE — OCCUPATIONAL THERAPY INITIAL EVALUATION ADULT - THERAPY FREQUENCY, OT EVAL
pt does not require skilled occupational therapy at this time, pt was educated on availability of LB dressing AE to improve independence with dressing s/p surgery 2* to abdominal pain, however states he spouse will assist her with LB dressing as needed. pt has a shower chair in home, and declined education re: commode to improve ind. with toilet transfers stating she will have no problem at home. Pt appears to be functionally at or close to baseline at this time.

## 2019-10-16 NOTE — PROGRESS NOTE ADULT - SUBJECTIVE AND OBJECTIVE BOX
GENERAL SURGERY PROGRESS NOTE     CARLENE SOLOMON  64y  Female  Hospital day :1d  POD:  Procedure: Laparoscopic gastric bypass    OVERNIGHT EVENTS: Hypertensive to 188/91 over night. given 1.25 vasotec and 10mg lopressor. BP lowered and now in low 100s. Cain out at 11pm, voided 350. Pain 4/10. No nausea or vomiting currently. .     T(F): 97.9 (10-16-19 @ 05:00), Max: 98.5 (10-15-19 @ 20:30)  HR: 71 (10-16-19 @ 05:45) (68 - 100)  BP: 102/54 (10-16-19 @ 05:45) (101/57 - 181/92)  ABP: --  ABP(mean): --  RR: 14 (10-16-19 @ 05:45) (13 - 22)  SpO2: 100% (10-16-19 @ 05:45) (95% - 100%)    DIET/FLUIDS: dextrose 5%. 1000 milliLiter(s) IV Continuous <Continuous>  lactated ringers. 1000 milliLiter(s) IV Continuous <Continuous>  lactated ringers. 1000 milliLiter(s) IV Continuous <Continuous>      URINE:   10-15-19 @ 07:01  -  10-16-19 @ 07:00  --------------------------------------------------------  OUT: 439 mL       GI proph:  pantoprazole  Injectable 40 milliGRAM(s) IV Push daily    AC/ proph:   ABx:     PHYSICAL EXAM:  GENERAL: NAD, well-appearing, nasal cannula   CHEST/LUNG: Clear to auscultation bilaterally  HEART: Regular rate and rhythm  ABDOMEN: dressings dry and intact, abdomen soft, tender in right upper quadrant , Nondistended;   EXTREMITIES:  No clubbing, cyanosis, or edema      LABS  Labs:  CAPILLARY BLOOD GLUCOSE      POCT Blood Glucose.: 146 mg/dL (16 Oct 2019 00:07)  POCT Blood Glucose.: 156 mg/dL (15 Oct 2019 19:07)  POCT Blood Glucose.: 129 mg/dL (15 Oct 2019 08:31)                          11.9   14.14 )-----------( 207      ( 15 Oct 2019 23:00 )             37.1         10-15    135  |  97<L>  |  18  ----------------------------<  161<H>  5.1<H>   |  21  |  0.8      Magnesium, Serum: 1.9 mg/dL (10-15-19 @ 23:30)        RADIOLOGY & ADDITIONAL TESTS: No studies. GENERAL SURGERY PROGRESS NOTE     CARLENE SOLOMON  64y  Female  Hospital day :1d  POD:  Procedure: Laparoscopic gastric bypass    OVERNIGHT EVENTS: Hypertensive to 188/91 over night. given 1.25 vasotec and 10mg lopressor. BP lowered and now in low 100s. Cain out at 11pm, voided 350. Pain 4/10. No nausea or vomiting currently. .     T(F): 97.9 (10-16-19 @ 05:00), Max: 98.5 (10-15-19 @ 20:30)  HR: 71 (10-16-19 @ 05:45) (68 - 100)  BP: 102/54 (10-16-19 @ 05:45) (101/57 - 181/92)  RR: 14 (10-16-19 @ 05:45) (13 - 22)  SpO2: 100% (10-16-19 @ 05:45) (95% - 100%)    DIET/FLUIDS: dextrose 5%. 1000 milliLiter(s) IV Continuous <Continuous>  lactated ringers. 1000 milliLiter(s) IV Continuous <Continuous>  lactated ringers. 1000 milliLiter(s) IV Continuous <Continuous>      URINE:   10-15-19 @ 07:01  -  10-16-19 @ 07:00  --------------------------------------------------------  OUT: 439 mL       GI proph:  pantoprazole  Injectable 40 milliGRAM(s) IV Push daily    AC/ proph:   ABx:     PHYSICAL EXAM:  GENERAL: NAD, well-appearing, nasal cannula   CHEST/LUNG: Clear to auscultation bilaterally  HEART: Regular rate and rhythm  ABDOMEN: dressings dry and intact, abdomen soft, tender in right upper quadrant , Nondistended;   EXTREMITIES:  No clubbing, cyanosis, or edema      LABS  Labs:  CAPILLARY BLOOD GLUCOSE      POCT Blood Glucose.: 146 mg/dL (16 Oct 2019 00:07)  POCT Blood Glucose.: 156 mg/dL (15 Oct 2019 19:07)  POCT Blood Glucose.: 129 mg/dL (15 Oct 2019 08:31)                          11.9   14.14 )-----------( 207      ( 15 Oct 2019 23:00 )             37.1         10-15    135  |  97<L>  |  18  ----------------------------<  161<H>  5.1<H>   |  21  |  0.8      Magnesium, Serum: 1.9 mg/dL (10-15-19 @ 23:30)        RADIOLOGY & ADDITIONAL TESTS: No studies.

## 2019-10-16 NOTE — CHART NOTE - NSCHARTNOTEFT_GEN_A_CORE
SICU Transfer Note    64y Female w/ a PMHx of DM, asthma, JESSICA on home CPAP, GERD, landis’s esophagus, and obesity w/ BMI 53.7 s/p POD #1 natalia en y gastric bypass w/ BL tap block. Uncomplicated procedure. Extubated successfully. No acute events overnight. Ambulating. Cain out, voiding.     Surgery Information  OR time: 2h     EBL: 20cc       IV Fluids:  2100     Blood Products: None  UOP:  200      ASSESSMENT:  64y Female s/p RNY GBP w/ tap block    PLAN:   Neurologic:   No dx   Monitor for changes in mental status   Pain control w/ morphine PCA and gabapentin oral solution       Respiratory:   Satting well on RA  Incentive spirometry 10x/hour  JESSIAC, Home CPAP at bedside to be fitted this PM, only uses distilled water w/ CPAP     Cardiovascular:   No acute dx  Normotensive   EKG showed no hyper peaked t waves K was 5.1   Continue to hold home ASA as per primary team     Gastrointestinal/Nutrition:   LR @ 125. BMI 53.7  Junaid Clears phase I as per primary team, tolerating   No N/V. Zofran PRN. PPI     Renal/Genitourinary:   Cain d/c'd at [facility], voiding   Lytes: Na 135, K 5.1, Mg 1.9, IP 4.2- hypomagnesemia repleted     Hematologic:   LVX 40 BID for DVT PPX  SCD in place     Infectious Disease:   Preop clinda  Pen and sulfa allergy  Afebrile     Endocrine:   DM on ISS. Holding home metformin   FS Q6 while NPO    MSK: Ambulating. PT ordered     Disposition: d/g to 4b     Lines/Tubes:  PIVs    Signed out to Blue Team at 10:43 AM 10/16/19 spoke with TAMIA Gray

## 2019-10-16 NOTE — PATIENT PROFILE ADULT - PATIENT REPRESENTATIVE: ( YOU CAN CHOOSE ANY PERSON THAT CAN ASSIST YOU WITH YOUR HEALTH CARE PREFERENCES, DOES NOT HAVE TO BE A SPOUSE, IMMEDIATE FAMILY OR SIGNIFICANT OTHER/PARTNER)

## 2019-10-16 NOTE — CONSULT NOTE ADULT - SUBJECTIVE AND OBJECTIVE BOX
HPI: 63 yo F admitted on 10/15 for gastric bypass surgery. Prior to hospitalization she was ambulating independent with a cane. Post-op rehab consulted for eval.      PAST MEDICAL & SURGICAL HISTORY:  Barretts esophagus  GERD (gastroesophageal reflux disease)  Asthma  DM (diabetes mellitus)  Obesity  History of surgery: left knee  History of hysterectomy      Hospital Course:    TODAY'S SUBJECTIVE & REVIEW OF SYMPTOMS:     Constitutional WNL   Cardio WNL   Resp WNL   GI WNL  Heme WNL  Endo WNL  Skin WNL  MSK Weakness  Neuro WNL  Cognitive WNL  Psych WNL      MEDICATIONS  (STANDING):  BUpivacaine liposome 1.3% Injectable 20 milliLiter(s) Local Injection once  dextrose 5%. 1000 milliLiter(s) (50 mL/Hr) IV Continuous <Continuous>  dextrose 50% Injectable 12.5 Gram(s) IV Push once  dextrose 50% Injectable 25 Gram(s) IV Push once  dextrose 50% Injectable 25 Gram(s) IV Push once  enoxaparin Injectable 40 milliGRAM(s) SubCutaneous every 12 hours  gabapentin   Solution 125 milliGRAM(s) Oral every 8 hours  insulin lispro (HumaLOG) corrective regimen sliding scale   SubCutaneous three times a day before meals  lactated ringers. 1000 milliLiter(s) (125 mL/Hr) IV Continuous <Continuous>  morphine PCA (5 mG/mL) 30 milliLiter(s) PCA Continuous PCA Continuous  pantoprazole  Injectable 40 milliGRAM(s) IV Push daily    MEDICATIONS  (PRN):  dextrose 40% Gel 15 Gram(s) Oral once PRN Blood Glucose LESS THAN 70 milliGRAM(s)/deciliter  glucagon  Injectable 1 milliGRAM(s) IntraMuscular once PRN Glucose LESS THAN 70 milligrams/deciliter  naloxone Injectable 0.1 milliGRAM(s) IV Push every 3 minutes PRN For ANY of the following changes in patient status:  A. RR LESS THAN 10 breaths per minute, B. Oxygen saturation LESS THAN 90%, C. Sedation score of 6  ondansetron Injectable 4 milliGRAM(s) IV Push every 6 hours PRN Nausea  ondansetron Injectable 4 milliGRAM(s) IV Push every 6 hours PRN Nausea and/or Vomiting      FAMILY HISTORY:      Allergies    iodine (Rash)  penicillins (Anaphylaxis; Rash)  Seafood (Rash)  sulfa drugs (Anaphylaxis; Rash)    Intolerances        SOCIAL HISTORY:    [  ] Etoh  [  ] Smoking  [  ] Substance abuse     Home Environment:  [  ] Home Alone  [xx  ] Lives with Family  [  ] Home Health Aid    Dwelling:  [  ] Apartment  [x  ] Private House  [  ] Adult Home  [  ] Skilled Nursing Facility      [  ] Short Term  [  ] Long Term  [x  ] Stairs       Elevator [  ]    FUNCTIONAL STATUS PTA: (Check all that apply)  Ambulation: [ x  ]Independent    [  ] Dependent     [  ] Non-Ambulatory  Assistive Device: [x  ] SA Cane  [  ]  Q Cane  [  ] Walker  [  ]  Wheelchair  ADL : [ x ] Independent  [  ]  Dependent       Vital Signs Last 24 Hrs  T(C): 36.5 (16 Oct 2019 07:00), Max: 36.9 (15 Oct 2019 20:30)  T(F): 97.7 (16 Oct 2019 07:00), Max: 98.5 (15 Oct 2019 20:30)  HR: 75 (16 Oct 2019 09:00) (68 - 100)  BP: 103/62 (16 Oct 2019 09:00) (101/57 - 181/92)  BP(mean): 73 (16 Oct 2019 09:00) (72 - 111)  RR: 16 (16 Oct 2019 09:00) (13 - 22)  SpO2: 99% (16 Oct 2019 09:00) (95% - 100%)      PHYSICAL EXAM: Alert & Oriented X3  GENERAL: NAD, well-groomed, well-developed  HEAD:  Atraumatic, Normocephalic  CHEST/LUNG: Clear   HEART: S1S2+  ABDOMEN: Soft, Nontender  EXTREMITIES:  no calf tenderness    NERVOUS SYSTEM:  Cranial Nerves 2-12 intact [  ] Abnormal  [  ]  ROM: WFL all extremities [ x ]  Abnormal [  ]  Motor Strength: WFL all extremities  [ x ]  Abnormal [  ]  Sensation: intact to light touch [ x ] Abnormal [  ]  Reflexes: Symmetric [  ]  Abnormal [  ]    FUNCTIONAL STATUS:  Bed Mobility: Independent [  ]  Supervision [  ]  Needs Assistance [x  ]  N/A [  ]  Transfers: Independent [  ]  Supervision [  ]  Needs Assistance [x  ]  N/A [  ]   Ambulation: Independent [  ]  Supervision [  ]  Needs Assistance [  ]  N/A [  ]  ADL: Independent [  ] Requires Assistance [  ] N/A [  ]      LABS:                        11.9   14.14 )-----------( 207      ( 15 Oct 2019 23:00 )             37.1     10-15    135  |  97<L>  |  18  ----------------------------<  161<H>  5.1<H>   |  21  |  0.8    Ca    8.4<L>      15 Oct 2019 23:00  Phos  4.2     10-15  Mg     1.9     10-15            RADIOLOGY & ADDITIONAL STUDIES:    Assesment:

## 2019-10-16 NOTE — CONSULT NOTE ADULT - ASSESSMENT
IMPRESSION: Rehab of gait dysfunction    PRECAUTIONS: [  ] Cardiac  [  ] Respiratory  [  ] Seizures [  ] Contact Isolation  [  ] Droplet Isolation  [  ] Other    Weight Bearing Status:     RECOMMENDATION:    Out of Bed to Chair     DVT/Decubiti Prophylaxis    REHAB PLAN:     [ x  ] Bedside P/T 3-5 times a week   [   ]   Bedside O/T  2-3 times a week             [   ] No Rehab Therapy Indicated                   [   ]  Speech Therapy   Conditioning/ROM                                    ADL  Bed Mobility                                               Conditioning/ROM  Transfers                                                     Bed Mobility  Sitting /Standing Balance                         Transfers                                        Gait Training                                               Sitting/Standing Balance  Stair Training [   ]Applicable                    Home equipment Eval                                                                        Splinting  [   ] Only      GOALS:   ADL   [   ]   Independent                    Transfers  [ x  ] Independent                          Ambulation  [ x  ] Independent     [ x   ] With device                            [   ]  CG                                                         [   ]  CG                                                                  [   ] CG                            [    ] Min A                                                   [   ] Min A                                                              [   ] Min  A          DISCHARGE PLAN:   [   ]  Good candidate for Intensive Rehabilitation/Hospital based                                             Will tolerate 3hrs Intensive Rehab Daily                                       [  x  ]  Short Term Rehab in Skilled Nursing Facility                             vs          [  x  ]  Home with Outpatient or VN services                                         [    ]  Possible Candidate for Intensive Hospital based Rehab
ASSESSMENT:  64y Female s/p RNY GBP w/ tap block    PLAN:     Neurologic:   Lethargic but A&O x3  Pain control w/ morphine PCA  Received ofirmev and morphine push from anesthesia orders    Respiratory:   Satting well on RA  Incentive spirometry 10x/hour  JESSICA, Home CPAP at bedside to be fitted this PM    Cardiovascular:   No acute dx    Gastrointestinal/Nutrition:   LR @ 125  NPO   Junaid clears in AM    Renal/Genitourinary:   Cain in place, to be removed at 00:00    Hematologic:   LVX 40 BID for DVT PPX    Infectious Disease:   Preop clinda  Pen and sulfa allergy    Lines/Tubes:  PIVs  Cain    Endocrine:   DM on SSI  FS Q6 while NPO    Disposition: SICU    --------------------------------------------------------------------------------------

## 2019-10-16 NOTE — OCCUPATIONAL THERAPY INITIAL EVALUATION ADULT - BATHING TRAINING, ASSISTIVE DEVICE, OT EVAL
tub seat/pt educated on availability of LH sponge and OT mckenna purchasing LH sponge to improve energy conservation and ind with bathing s/p surgery

## 2019-10-16 NOTE — PHYSICAL THERAPY INITIAL EVALUATION ADULT - PRECAUTIONS/LIMITATIONS, REHAB EVAL
fall precautions/oxygen therapy device and L/min/abdominal Sx, 2L via NC, liquid diet/surgical precautions

## 2019-10-16 NOTE — PROGRESS NOTE ADULT - SUBJECTIVE AND OBJECTIVE BOX
CARLENE SOLOMON  [id number]  64y Female s/p lap RNY GBP w/ tap block    Indication for ICU admission:  Admit Date:10-15-19  ICU Date: 10-15-19  OR Date: 10-15-19      iodine (Rash)  penicillins (Anaphylaxis; Rash)  Seafood (Rash)  sulfa drugs (Anaphylaxis; Rash)    PAST MEDICAL & SURGICAL HISTORY:  Barretts esophagus  GERD (gastroesophageal reflux disease)  Asthma  DM (diabetes mellitus)  Obesity  History of surgery: left knee  History of hysterectomy    Home Medications:  Aspir 81 oral delayed release tablet: 4 tab(s) orally once a day (15 Oct 2019 08:35)  Januvia 100 mg oral tablet: 1 tab(s) orally once a day (15 Oct 2019 08:35)  Levemir FlexPen 100 units/mL subcutaneous solution: 40 unit(s) subcutaneous once a day (at bedtime). 20 units 10/14/19 (15 Oct 2019 08:35)  metFORMIN 1000 mg oral tablet: 1 tab(s) orally 2 times a day (15 Oct 2019 08:35)  montelukast 10 mg oral tablet: 1 tab(s) orally once a day (15 Oct 2019 08:35)  Multiple Vitamins oral tablet: 1 tab(s) orally once a day (15 Oct 2019 08:35)  RABEprazole 20 mg oral delayed release tablet: 1 tab(s) orally once a day (15 Oct 2019 08:35)  simvastatin 10 mg oral tablet: 1 tab(s) orally once a day (at bedtime) (15 Oct 2019 08:35)  Vitamin B12:  (15 Oct 2019 08:35)  Vitamin D3:  (15 Oct 2019 08:35)        24HRS EVENT:  Overnight: esther d/c'd at [facility]    Neurologic:   Lethargic but A&O x3  Pain control w/ morphine PCA  Received ofirmev and morphine push from anesthesia orders    Respiratory:   Satting well on RA  Incentive spirometry 10x/hour  JESSICA, Home CPAP at bedside to be fitted this PM    Cardiovascular:   No acute dx    Gastrointestinal/Nutrition:   LR @ 125  NPO   Junaid clears in AM    Renal/Genitourinary:   Cain in place, to be removed at 00:00    Hematologic:   LVX 40 BID for DVT PPX    Infectious Disease:   Preop clinda  Pen and sulfa allergy      Endocrine:   DM on SSI  FS Q6 while NPO      DVT Prophylaxis: enoxaparin Injectable 40 milliGRAM(s) SubCutaneous every 12 hours    GI Prophylaxis:pantoprazole  Injectable 40 milliGRAM(s) IV Push daily    ***Tubes/Lines/Drains  ***  Peripheral IV  Urinary Catheter		Indication: Strict I&O       REVIEW OF SYSTEMS    [ X] A ten-point review of systems was otherwise negative except as noted.  [ ] Due to altered mental status/intubation, subjective information were not able to be obtained from the patient. History was obtained, to the extent possible, from review of the chart and collateral sources of information. CARLENE SOLOMON  [id number]  64y Female s/p lap RNY GBP w/ tap block    Indication for ICU admission: s/p lap natalia en y gastric bypass w/ tap block   Admit Date:10-15-19  ICU Date: 10-15-19  OR Date: 10-15-19    Allergies:  iodine (Rash)  penicillins (Anaphylaxis; Rash)  Seafood (Rash)  sulfa drugs (Anaphylaxis; Rash)    PAST MEDICAL & SURGICAL HISTORY:  Barretts esophagus  GERD (gastroesophageal reflux disease)  Asthma  DM (diabetes mellitus)  Obesity  History of surgery: left knee  History of hysterectomy    Home Medications:  Aspir 81 oral delayed release tablet: 4 tab(s) orally once a day (15 Oct 2019 08:35)  Januvia 100 mg oral tablet: 1 tab(s) orally once a day (15 Oct 2019 08:35)  Levemir FlexPen 100 units/mL subcutaneous solution: 40 unit(s) subcutaneous once a day (at bedtime). 20 units 10/14/19 (15 Oct 2019 08:35)  metFORMIN 1000 mg oral tablet: 1 tab(s) orally 2 times a day (15 Oct 2019 08:35)  montelukast 10 mg oral tablet: 1 tab(s) orally once a day (15 Oct 2019 08:35)  Multiple Vitamins oral tablet: 1 tab(s) orally once a day (15 Oct 2019 08:35)  RABEprazole 20 mg oral delayed release tablet: 1 tab(s) orally once a day (15 Oct 2019 08:35)  simvastatin 10 mg oral tablet: 1 tab(s) orally once a day (at bedtime) (15 Oct 2019 08:35)  Vitamin B12:  (15 Oct 2019 08:35)  Vitamin D3:  (15 Oct 2019 08:35)        24HRS EVENT:  Overnight: santana d/c'd at [facility]    Neurologic:   Lethargic but A&O x3  Pain control w/ morphine PCA  Received ofirmev and morphine push from anesthesia orders    Respiratory:   Satting well on RA  Incentive spirometry 10x/hour  JESSICA, Home CPAP at bedside to be fitted this PM    Cardiovascular:   No acute dx    Gastrointestinal/Nutrition:   LR @ 125  NPO   Junaid clears in AM    Renal/Genitourinary:   Santana in place, to be removed at 00:00    Hematologic:   LVX 40 BID for DVT PPX    Infectious Disease:   Preop clinda  Pen and sulfa allergy      Endocrine:   DM on SSI  FS Q6 while NPO      DVT Prophylaxis: enoxaparin Injectable 40 milliGRAM(s) SubCutaneous every 12 hours    GI Prophylaxis:pantoprazole  Injectable 40 milliGRAM(s) IV Push daily    ***Tubes/Lines/Drains  ***  Peripheral IV  Urinary Catheter		Indication: Strict I&O       REVIEW OF SYSTEMS    [ X] A ten-point review of systems was otherwise negative except as noted.  [ ] Due to altered mental status/intubation, subjective information were not able to be obtained from the patient. History was obtained, to the extent possible, from review of the chart and collateral sources of information.    Daily Height in cm: 152.4 (15 Oct 2019 12:15)    Daily     Diet, Clear Liquid:   Bariatric Clear Liquid (BARICLLIQ) (10-16-19 @ 07:07)      CURRENT MEDS:  Neurologic Medications  acetaminophen  IVPB .. 1000 milliGRAM(s) IV Intermittent once  gabapentin   Solution 125 milliGRAM(s) Oral every 8 hours  ketorolac   Injectable 15 milliGRAM(s) IV Push every 6 hours  morphine PCA (5 mG/mL) 30 milliLiter(s) PCA Continuous PCA Continuous  ondansetron Injectable 4 milliGRAM(s) IV Push every 6 hours PRN Nausea  ondansetron Injectable 4 milliGRAM(s) IV Push every 6 hours PRN Nausea and/or Vomiting    Respiratory Medications    Cardiovascular Medications    Gastrointestinal Medications  dextrose 5%. 1000 milliLiter(s) IV Continuous <Continuous>  lactated ringers. 1000 milliLiter(s) IV Continuous <Continuous>  lactated ringers. 1000 milliLiter(s) IV Continuous <Continuous>  pantoprazole  Injectable 40 milliGRAM(s) IV Push daily    Genitourinary Medications    Hematologic/Oncologic Medications  enoxaparin Injectable 40 milliGRAM(s) SubCutaneous every 12 hours    Antimicrobial/Immunologic Medications    Endocrine/Metabolic Medications  dextrose 40% Gel 15 Gram(s) Oral once PRN Blood Glucose LESS THAN 70 milliGRAM(s)/deciliter  dextrose 50% Injectable 12.5 Gram(s) IV Push once  dextrose 50% Injectable 25 Gram(s) IV Push once  dextrose 50% Injectable 25 Gram(s) IV Push once  glucagon  Injectable 1 milliGRAM(s) IntraMuscular once PRN Glucose LESS THAN 70 milligrams/deciliter  insulin lispro (HumaLOG) corrective regimen sliding scale   SubCutaneous three times a day before meals    Topical/Other Medications  BUpivacaine liposome 1.3% Injectable 20 milliLiter(s) Local Injection once  naloxone Injectable 0.1 milliGRAM(s) IV Push every 3 minutes PRN For ANY of the following changes in patient status:  A. RR LESS THAN 10 breaths per minute, B. Oxygen saturation LESS THAN 90%, C. Sedation score of 6      ICU Vital Signs Last 24 Hrs  T(F): 97.7   HR: 72  BP: 104/58   BP(mean): 72   RR: 16   SpO2: 100%       I&O's Summary    15 Oct 2019 07:01  -  16 Oct 2019 07:00  --------------------------------------------------------  IN: 1500 mL / OUT: 789 mL / NET: 711 mL      I&O's Detail    15 Oct 2019 07:01  -  16 Oct 2019 07:00  --------------------------------------------------------  IN:    lactated ringers.: 1500 mL  Total IN: 1500 mL    OUT:    Indwelling Catheter - Urethral: 439 mL    Voided: 350 mL  Total OUT: 789 mL    Total NET: 711 mL        PHYSICAL EXAM:    General/Neuro: A&Ox3. Follows commands. Neuro intact.     Lungs: Clear to auscultation, Normal expansion/effort.     Cardiovascular : S1, S2.  Regular rate and rhythm.     GI: Abdomen soft, Non-tender, Non-distended.    Wound: clean, dry and intact.     Extremities: Extremities warm, pink, well-perfused.     Derm: Good skin turgor, no skin breakdown.      :  No santana, voiding       LABS:  CAPILLARY BLOOD GLUCOSE      POCT Blood Glucose.: 146 mg/dL (16 Oct 2019 00:07)  POCT Blood Glucose.: 156 mg/dL (15 Oct 2019 19:07)                          11.9   14.14 )-----------( 207      ( 15 Oct 2019 23:00 )             37.1       10-15    135  |  97<L>  |  18  ----------------------------<  161<H>  5.1<H>   |  21  |  0.8    Ca    8.4<L>      15 Oct 2019 23:00  Phos  4.2     10-15  Mg     1.9     10-15

## 2019-10-16 NOTE — OCCUPATIONAL THERAPY INITIAL EVALUATION ADULT - RANGE OF MOTION EXAMINATION, UPPER EXTREMITY
Right UE Active ROM was WNL (within normal limits)/LUE shoulder flexion ~1/2 AROM at baseline due to rotator cuff injury, elbow/wrist/digits WFL

## 2019-10-16 NOTE — PROGRESS NOTE ADULT - ASSESSMENT
ASSESSMENT:  64y Female s/p RNY GBP w/ tap block    PLAN:     Neurologic:   Lethargic but A&O x3  Pain control w/ morphine PCA  Received ofirmev and morphine push from anesthesia orders    Respiratory:   Satting well on RA  Incentive spirometry 10x/hour  JESSICA, Home CPAP at bedside to be fitted this PM    Cardiovascular:   No acute dx    Gastrointestinal/Nutrition:   LR @ 125  NPO   Junaid clears in AM    Renal/Genitourinary:   Cain d/c'd at [facility], tov  replete electrolytes prn  Lytes: Na 135, K 5.1, Mg 1.9, IP 4.2- hypomagnesemia repleted     Hematologic:   LVX 40 BID for DVT PPX    Infectious Disease:   Preop clinda  Pen and sulfa allergy    Lines/Tubes:  PIVs    Endocrine:   DM on SSI  FS Q6 while NPO    Disposition: d/g to 4b    -------------------------------------------------------------------------------------- ASSESSMENT:  64y Female s/p RNY GBP w/ tap block    PLAN:   Neurologic:   No dx   Monitor for changes in mental status   Pain control w/ morphine PCA and gabapentin oral solution       Respiratory:   Satting well on RA  Incentive spirometry 10x/hour  JESSICA, Home CPAP at bedside to be fitted this PM, only uses distilled water w/ CPAP     Cardiovascular:   No acute dx  Normotensive   Continue to hold home ASA as per primary team     Gastrointestinal/Nutrition:   LR @ 125. BMI 53.7  Junaid Clears phase I as per primary team, tolerating   No N/V. Zofran PRN. PPI     Renal/Genitourinary:   Cain d/c'd at [facility], voiding   Lytes: Na 135, K 5.1, Mg 1.9, IP 4.2- hypomagnesemia repleted     Hematologic:   LVX 40 BID for DVT PPX  SCD in place     Infectious Disease:   Preop clinda  Pen and sulfa allergy  Afebrile     Lines/Tubes:  PIVs    Endocrine:   DM on ISS. Holding home metformin   FS Q6 while NPO    MSK: Ambulating     Disposition: d/g to 4b     --------------------------------------------------------------------------------------

## 2019-10-16 NOTE — PROGRESS NOTE ADULT - ASSESSMENT
64y Female s/p RNY GBP w/ tap block. POD 1.     Plan:   - dominic clears   - monitor BP (was hypertensive overnight, now BP in low 100s)  - ambulate   - IS  - pain control   - downgrade anticipated

## 2019-10-16 NOTE — PHYSICAL THERAPY INITIAL EVALUATION ADULT - GENERAL OBSERVATIONS, REHAB EVAL
11:00-11:30. chart reviewed. Pt received semi-estrella at B/S, alert, oriented, able to follow multi-step instructions and agreeable to PT evaluation. + IV, + morphine pump, + sequentials, c/o abdominal pain 6-7/10. initial vitals 127/57 HR 77 SPO2 on O2 2 L via NC 99%. post ambulation 132/61 HR 87, Spo2 82% on RA. applied O2 2L SPO2 94%.

## 2019-10-17 ENCOUNTER — TRANSCRIPTION ENCOUNTER (OUTPATIENT)
Age: 64
End: 2019-10-17

## 2019-10-17 VITALS
SYSTOLIC BLOOD PRESSURE: 135 MMHG | HEART RATE: 96 BPM | TEMPERATURE: 98 F | DIASTOLIC BLOOD PRESSURE: 63 MMHG | RESPIRATION RATE: 18 BRPM

## 2019-10-17 LAB
GLUCOSE BLDC GLUCOMTR-MCNC: 108 MG/DL — HIGH (ref 70–99)
GLUCOSE BLDC GLUCOMTR-MCNC: 131 MG/DL — HIGH (ref 70–99)
GLUCOSE BLDC GLUCOMTR-MCNC: 132 MG/DL — HIGH (ref 70–99)
GLUCOSE BLDC GLUCOMTR-MCNC: 156 MG/DL — HIGH (ref 70–99)
HCT VFR BLD CALC: 34.7 % — LOW (ref 37–47)
HGB BLD-MCNC: 10.9 G/DL — LOW (ref 12–16)
MCHC RBC-ENTMCNC: 28.8 PG — SIGNIFICANT CHANGE UP (ref 27–31)
MCHC RBC-ENTMCNC: 31.4 G/DL — LOW (ref 32–37)
MCV RBC AUTO: 91.8 FL — SIGNIFICANT CHANGE UP (ref 81–99)
NRBC # BLD: 0 /100 WBCS — SIGNIFICANT CHANGE UP (ref 0–0)
PLATELET # BLD AUTO: 215 K/UL — SIGNIFICANT CHANGE UP (ref 130–400)
RBC # BLD: 3.78 M/UL — LOW (ref 4.2–5.4)
RBC # FLD: 14.6 % — HIGH (ref 11.5–14.5)
WBC # BLD: 9.99 K/UL — SIGNIFICANT CHANGE UP (ref 4.8–10.8)
WBC # FLD AUTO: 9.99 K/UL — SIGNIFICANT CHANGE UP (ref 4.8–10.8)

## 2019-10-17 PROCEDURE — 71045 X-RAY EXAM CHEST 1 VIEW: CPT | Mod: 26

## 2019-10-17 RX ORDER — SITAGLIPTIN 50 MG/1
1 TABLET, FILM COATED ORAL
Qty: 0 | Refills: 0 | DISCHARGE

## 2019-10-17 RX ORDER — INSULIN DETEMIR 100/ML (3)
40 INSULIN PEN (ML) SUBCUTANEOUS
Qty: 0 | Refills: 0 | DISCHARGE

## 2019-10-17 RX ORDER — PREGABALIN 225 MG/1
0 CAPSULE ORAL
Qty: 0 | Refills: 0 | DISCHARGE

## 2019-10-17 RX ORDER — METFORMIN HYDROCHLORIDE 850 MG/1
1 TABLET ORAL
Qty: 0 | Refills: 0 | DISCHARGE

## 2019-10-17 RX ORDER — SIMVASTATIN 20 MG/1
1 TABLET, FILM COATED ORAL
Qty: 0 | Refills: 0 | DISCHARGE

## 2019-10-17 RX ORDER — CHOLECALCIFEROL (VITAMIN D3) 125 MCG
0 CAPSULE ORAL
Qty: 0 | Refills: 0 | DISCHARGE

## 2019-10-17 RX ORDER — RABEPRAZOLE 20 MG/1
1 TABLET, DELAYED RELEASE ORAL
Qty: 0 | Refills: 0 | DISCHARGE

## 2019-10-17 RX ORDER — ASPIRIN/CALCIUM CARB/MAGNESIUM 324 MG
4 TABLET ORAL
Qty: 0 | Refills: 0 | DISCHARGE

## 2019-10-17 RX ORDER — MONTELUKAST 4 MG/1
1 TABLET, CHEWABLE ORAL
Qty: 0 | Refills: 0 | DISCHARGE

## 2019-10-17 RX ADMIN — ENOXAPARIN SODIUM 40 MILLIGRAM(S): 100 INJECTION SUBCUTANEOUS at 05:37

## 2019-10-17 RX ADMIN — PANTOPRAZOLE SODIUM 40 MILLIGRAM(S): 20 TABLET, DELAYED RELEASE ORAL at 14:02

## 2019-10-17 RX ADMIN — ONDANSETRON 4 MILLIGRAM(S): 8 TABLET, FILM COATED ORAL at 14:03

## 2019-10-17 RX ADMIN — Medication 2: at 08:25

## 2019-10-17 RX ADMIN — GABAPENTIN 125 MILLIGRAM(S): 400 CAPSULE ORAL at 05:37

## 2019-10-17 RX ADMIN — Medication 85 MILLIMOLE(S): at 03:28

## 2019-10-17 RX ADMIN — GABAPENTIN 125 MILLIGRAM(S): 400 CAPSULE ORAL at 14:02

## 2019-10-17 NOTE — PROGRESS NOTE ADULT - ATTENDING COMMENTS
ASSESSMENT:  64y Female s/p RNY GBP w/ tap block    PLAN:   Neurologic:   No dx   Monitor for changes in mental status   Pain control w/ morphine PCA and gabapentin oral solution       Respiratory:   Satting well on RA  Incentive spirometry 10x/hour  JESSICA, Home CPAP at bedside to be fitted this PM, only uses distilled water w/ CPAP     Cardiovascular:   No acute dx  Normotensive   Continue to hold home ASA as per primary team     Gastrointestinal/Nutrition:   LR @ 125. BMI 53.7  Junaid Clears phase I as per primary team, tolerating   No N/V. Zofran PRN. PPI     Renal/Genitourinary:   Cain d/c'd at MN, voiding   Lytes: Na 135, K 5.1, Mg 1.9, IP 4.2- hypomagnesemia repleted     Hematologic:   LVX 40 BID for DVT PPX  SCD in place     Infectious Disease:   Preop clinda  Pen and sulfa allergy  Afebrile     Lines/Tubes:  PIVs    Endocrine:   DM on ISS. Holding home metformin   FS Q6 while NPO    MSK: Ambulating     Disposition: d/g to 4b
Patient clinically stable.  Discussed need for CPAP use, which the patient refused overnight.
Patient clinically stable.  Tolerating diet.  Ready for discharge

## 2019-10-17 NOTE — PROGRESS NOTE ADULT - SUBJECTIVE AND OBJECTIVE BOX
GENERAL SURGERY PROGRESS NOTE     CARLENE SOLOMON  64y  Female  Hospital day :2d  POD:  Procedure: Laparoscopic gastric bypass    OVERNIGHT EVENTS: Patient tolerating 1st row of 4oz, One more to complete. . Ambulated with PT. Pain improving.     T(F): 97.2 (10-17-19 @ 00:00), Max: 98 (10-16-19 @ 11:17)  HR: 98 (10-17-19 @ 00:00) (72 - 98)  BP: 140/69 (10-17-19 @ 00:00) (103/62 - 140/69)  ABP: --  ABP(mean): --  RR: 18 (10-17-19 @ 00:00) (16 - 20)  SpO2: 99% (10-16-19 @ 09:00) (99% - 100%)    DIET/FLUIDS: dextrose 5%. 1000 milliLiter(s) IV Continuous <Continuous>      URINE:   10-15-19 @ 07:01  -  10-16-19 @ 07:00  --------------------------------------------------------  OUT: 439 mL       GI proph:  pantoprazole  Injectable 40 milliGRAM(s) IV Push daily    AC/ proph:   ABx:     PHYSICAL EXAM:  GENERAL: NAD, well-appearing  CHEST/LUNG: Clear to auscultation bilaterally  HEART: Regular rate and rhythm  ABDOMEN: Soft, LLQ tender, Nondistended;   EXTREMITIES:  No clubbing, cyanosis, or edema      LABS  Labs:  CAPILLARY BLOOD GLUCOSE      POCT Blood Glucose.: 134 mg/dL (16 Oct 2019 21:12)  POCT Blood Glucose.: 146 mg/dL (16 Oct 2019 17:44)  POCT Blood Glucose.: 140 mg/dL (16 Oct 2019 12:19)                          10.9   9.99  )-----------( 215      ( 16 Oct 2019 20:36 )             34.7         10-16    139  |  100  |  11  ----------------------------<  143<H>  4.7   |  26  |  0.7      Calcium, Total Serum: 8.3 mg/dL (10-16-19 @ 20:36)          RADIOLOGY & ADDITIONAL TESTS: No post op studies

## 2019-10-17 NOTE — DIETITIAN INITIAL EVALUATION ADULT. - FACTORS AFF FOOD INTAKE
Pt is tolerating clear liquids, ate most of liquids/jello on tray. Pt is very upset to be d/c today. Reports nausea and dizziness. RD provided discharge diet education, pt scheduled to see outpt RD in 1 week. Reports receiving prior proper education. Allergy to seafood and iodine confirmed. PTA pt supplements daily MVI, vit D3, B12, and Ca. Denies chew/swallow ability.

## 2019-10-17 NOTE — DISCHARGE NOTE PROVIDER - NSDCCPCAREPLAN_GEN_ALL_CORE_FT
PRINCIPAL DISCHARGE DIAGNOSIS  Diagnosis: S/P gastric bypass  Assessment and Plan of Treatment: Pain: Take pain medications as directed prior to surgery.   Diet: Continue diet as instructed.   Activity: Continue to ambulate as tolerated.   Follow up: With Dr. Fernandes as scheduled.

## 2019-10-17 NOTE — DISCHARGE NOTE PROVIDER - HOSPITAL COURSE
64F s/p laparoscopic gastric bypass. Post-operatively the pt was admitted to the SICU for hemodynamic monitoring. POD1 she was hypertensive and given vasotec and lopressor with good resolution. Her santana was removed and she passed TOV. She was advanced to a bariatric phase 1 diet, which she was able to tolerate. At the time of discharge, her pain was controlled, she was able to tolerate a bariatric diet, and have no nausea or vomiting. she was medically stable. 64F s/p laparoscopic gastric bypass. Patient did well postoperatively; tolerating bariatric diet and without complaints.  Patient is clinically stable for discharge.

## 2019-10-17 NOTE — PROGRESS NOTE ADULT - ASSESSMENT
64y Female s/p RNY GBP w/ tap block. POD 1.     Plan:   - PT: home with PT   - OT: patient has all necessary equipment at home   - d/c PCA when tolerating entire page   - d/c home today if tolerating and stable 64y Female s/p RNY GBP w/ tap block. POD 1.     Plan:   - CLD, IVL   - PT: home with PT   - OT: patient has all necessary equipment at home   - d/c home today if tolerating and stable 64y Female s/p RNY GBP w/ tap block. POD 1.     Plan:   - Continue bariatric phase 1 dieet  - PT: home with PT   - OT: patient has all necessary equipment at home   - d/c home today

## 2019-10-17 NOTE — DISCHARGE NOTE NURSING/CASE MANAGEMENT/SOCIAL WORK - PATIENT PORTAL LINK FT
You can access the FollowMyHealth Patient Portal offered by [facility] by registering at the following website: [email or web address]. By joining [employer]’s FollowMyHealth portal, you will also be able to view your health information using other applications (apps) compatible with our system.

## 2019-10-17 NOTE — DIETITIAN INITIAL EVALUATION ADULT. - DIET TYPE
RECS: Continue phase 1 clear liquids and advance to phase 2 x1 week or per LIP. No further diet interventions.

## 2019-10-17 NOTE — DISCHARGE NOTE PROVIDER - CARE PROVIDER_API CALL
Rose Mary Fernandes)  Surgery  [address] [place] [address], [address] [place] [address]  Phone: [phone]  Fax: [phone]  Follow Up Time:

## 2019-10-17 NOTE — DIETITIAN INITIAL EVALUATION ADULT. - OTHER INFO
Pt admitted for bariatric sx RNY GBP with tap block. Being d/c home today. Tolerating phase 1 clear liquids.

## 2019-10-17 NOTE — DIETITIAN INITIAL EVALUATION ADULT. - RD TO REMAIN AVAILABLE
RD to monitor diet order, energy intake, NFPF, body comp, glucose and renal profile. Pt not at risk f/u 7 days

## 2019-10-17 NOTE — DISCHARGE NOTE PROVIDER - NSDCFUSCHEDAPPT_GEN_ALL_CORE_FT
CARLENE SOLOMON ; 10/23/2019 ; [facility] [address] CARLENE SOLOMON ; 10/23/2019 ; [facility] [address]

## 2019-10-17 NOTE — DIETITIAN INITIAL EVALUATION ADULT. - ENERGY NEEDS
estimated energy needs: 1630kcal (MSJx1.0AF) obese bmi  estimated protein needs: 55-64g (1.2-1.4g/kgIBW) higher d/t large difference btwn ABW and IBW  estimated fluid needs: 1ml/kcal

## 2019-10-22 DIAGNOSIS — K21.9 GASTRO-ESOPHAGEAL REFLUX DISEASE WITHOUT ESOPHAGITIS: ICD-10-CM

## 2019-10-22 DIAGNOSIS — Z88.0 ALLERGY STATUS TO PENICILLIN: ICD-10-CM

## 2019-10-22 DIAGNOSIS — Z79.82 LONG TERM (CURRENT) USE OF ASPIRIN: ICD-10-CM

## 2019-10-22 DIAGNOSIS — Z87.891 PERSONAL HISTORY OF NICOTINE DEPENDENCE: ICD-10-CM

## 2019-10-22 DIAGNOSIS — Z79.84 LONG TERM (CURRENT) USE OF ORAL HYPOGLYCEMIC DRUGS: ICD-10-CM

## 2019-10-22 DIAGNOSIS — E66.01 MORBID (SEVERE) OBESITY DUE TO EXCESS CALORIES: ICD-10-CM

## 2019-10-22 DIAGNOSIS — R16.0 HEPATOMEGALY, NOT ELSEWHERE CLASSIFIED: ICD-10-CM

## 2019-10-22 DIAGNOSIS — J45.909 UNSPECIFIED ASTHMA, UNCOMPLICATED: ICD-10-CM

## 2019-10-22 DIAGNOSIS — Z88.2 ALLERGY STATUS TO SULFONAMIDES: ICD-10-CM

## 2019-10-22 DIAGNOSIS — K22.70 BARRETT'S ESOPHAGUS WITHOUT DYSPLASIA: ICD-10-CM
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
## 2019-10-22 DIAGNOSIS — Z90.710 ACQUIRED ABSENCE OF BOTH CERVIX AND UTERUS: ICD-10-CM

## 2019-10-22 DIAGNOSIS — G47.33 OBSTRUCTIVE SLEEP APNEA (ADULT) (PEDIATRIC): ICD-10-CM

## 2019-10-22 DIAGNOSIS — Z91.013 ALLERGY TO SEAFOOD: ICD-10-CM

## 2019-10-22 DIAGNOSIS — Z91.041 RADIOGRAPHIC DYE ALLERGY STATUS: ICD-10-CM

## 2019-10-22 DIAGNOSIS — K66.0 PERITONEAL ADHESIONS (POSTPROCEDURAL) (POSTINFECTION): ICD-10-CM

## 2019-10-22 DIAGNOSIS — I97.3 POSTPROCEDURAL HYPERTENSION: ICD-10-CM

## 2019-10-22 DIAGNOSIS — E11.9 TYPE 2 DIABETES MELLITUS WITHOUT COMPLICATIONS: ICD-10-CM

## 2019-10-22 RX ORDER — CALCIUM CARB/VITAMIN D3/VIT K1 650MG-12.5
TABLET,CHEWABLE ORAL DAILY
Refills: 0 | Status: ACTIVE | COMMUNITY

## 2019-10-22 RX ORDER — PEDI MULTIVIT NO.25/FOLIC ACID 300 MCG
60 TABLET,CHEWABLE ORAL DAILY
Refills: 0 | Status: ACTIVE | COMMUNITY

## 2019-10-23 ENCOUNTER — APPOINTMENT (OUTPATIENT)
Dept: SURGERY | Facility: CLINIC | Age: 64
End: 2019-10-23
Payer: MEDICARE

## 2019-10-23 VITALS — HEIGHT: 59.5 IN | BODY MASS INDEX: 55.15 KG/M2 | WEIGHT: 277.2 LBS

## 2019-10-23 PROCEDURE — 99024 POSTOP FOLLOW-UP VISIT: CPT

## 2019-10-23 RX ORDER — GABAPENTIN 250 MG/5ML
250 SOLUTION ORAL EVERY 8 HOURS
Qty: 15 | Refills: 0 | Status: DISCONTINUED | COMMUNITY
Start: 2019-10-07 | End: 2019-10-23

## 2019-10-30 NOTE — ASSESSMENT
[___ Days Post Op] : [unfilled] days [Previous Visit Weight: ___] : Previous visit weight: [unfilled] lbs [Today's Weight: ___] : Today's weight:[unfilled] lbs [Today's BMI: ___] : Today's BMI: [unfilled] [de-identified] : s/p gastric bypass, currently still on phase 1 diet, was advised to start phase 2 diet.\par She is ambulating at home frequently.\par Taking enough liquids PO including two protein shakes, 32 oz of water and Jello.\par \par f/up in 3 weeks\par Advised to start phase 2\par No heavy lifting\par call if any concerns

## 2019-10-30 NOTE — PHYSICAL EXAM
[de-identified] : Clear to auscultation bilaterally [de-identified] : Regular rate and rhythm [de-identified] : soft, non-tender, non-distended [de-identified] : incisions healing well

## 2019-10-30 NOTE — DATA REVIEWED
[FreeTextEntry1] : Ht: 59.5\par Pre-op wt: 288.2 lbs\par Current wt: 277 lbs\par Wt change since surgery: -11 lbs\par EBL: 6%\par BMI: 55.05

## 2019-10-30 NOTE — HISTORY OF PRESENT ILLNESS
[Date of Surgery: ___] : Date of Surgery:   [unfilled] [Procedure: ___] : Procedure performed: [unfilled]  [Surgeon Name:   ___] : Surgeon Name: Dr. BOWDEN [Pre-Op Weight ___] : Pre-op weight was [unfilled] lbs [___ Days Post Op] : [unfilled] days  [Phase 1] : Phase 1 [Walking] : walking [Diabetes] : Diabetes [Sleep Apnea] : Sleep Apnea [GERD] : GERD [de-identified] : Patient has surgery one week ago, she is doing well.\par She denies any GERD, abdominal pain, nausea or vomiting.\par She is still using CPAP machine and she has improvement in her diabetes.\par She continues to ambulate with cane and has no improvement in her knee/back pain.

## 2019-11-13 ENCOUNTER — APPOINTMENT (OUTPATIENT)
Dept: SURGERY | Facility: CLINIC | Age: 64
End: 2019-11-13
Payer: MEDICARE

## 2019-11-13 VITALS — WEIGHT: 267.2 LBS | HEIGHT: 59.5 IN | BODY MASS INDEX: 53.16 KG/M2

## 2019-11-13 PROCEDURE — 99024 POSTOP FOLLOW-UP VISIT: CPT

## 2019-11-13 RX ORDER — ENOXAPARIN SODIUM 100 MG/ML
40 INJECTION SUBCUTANEOUS
Qty: 28 | Refills: 0 | Status: DISCONTINUED | COMMUNITY
Start: 2019-10-07 | End: 2019-11-13

## 2019-11-13 RX ORDER — METFORMIN HYDROCHLORIDE 1000 MG/1
1000 TABLET, COATED ORAL
Refills: 0 | Status: DISCONTINUED | COMMUNITY
End: 2019-11-13

## 2019-11-13 RX ORDER — URSODIOL 300 MG/1
300 CAPSULE ORAL
Qty: 60 | Refills: 5 | Status: DISCONTINUED | COMMUNITY
Start: 2019-11-12 | End: 2019-11-13

## 2019-11-14 NOTE — HISTORY OF PRESENT ILLNESS
[Procedure: ___] : Procedure performed: [unfilled]  [Date of Surgery: ___] : Date of Surgery:   [unfilled] [Pre-Op Weight ___] : Pre-op weight was [unfilled] lbs [___ Months Post Op] : [unfilled] months [de-identified] : Patient had surgery approximately 1 month ago. \par She denies heartburn/reflux/vomiting and food intolerance. She was constipated and took MOM with good results.  Will recommend adding MiraLax daily\par Diabetes, back and knee pain improved.  She is compliant with CPAP use.

## 2019-11-14 NOTE — DATA REVIEWED
[FreeTextEntry1] : \par Wt. change since last visit: -10 lbs\par %EWL: 11\par TWL: 21 lbs\par BMI: 53\par \par

## 2019-11-14 NOTE — PHYSICAL EXAM
[Normal] : affect appropriate [Obese, well nourished, in no acute distress] : obese, well nourished, in no acute distress [de-identified] : Soft, nondistended.  Incisions dry, clean and healing well.

## 2019-11-14 NOTE — ASSESSMENT
[FreeTextEntry1] : CARLENE SOLOMON is a 64 year female seen today for Bariatric postoperative visit.  She feels well and offered no complaints.\par Patient is compliant with dietary guidelines.\par She eats 3 meals/day.  Breakfast - Chobani yogurt or eggs.  Lunch - lentil soup or ricotta cheese.  Dinner - chicken soup, meatballs or ricotta cheese. She is drinking only 1/2 premier shake as she feels that it is too thick.  Will let patient try the Clear Premier shakes. \par Fluid intake is averaging around 32 oz/day. Recommend that she increase her fluids to around 48-64 oz/day as  increase fluid intake would also help with her constipation.\par She is walking daily for 15 minutes around the house and 20-25 minutes outdoors.  Reinforced no heavy lifting and pulling for 1 more month.\par \par

## 2019-11-14 NOTE — PLAN
[FreeTextEntry1] : PLAN: Actigall 300 mg twice daily.  Will do mail order.\par            CMP in 1 month.\par            Increase fluid intake.\par            RTO in 2 months with blood work.\par            Call with concerns.

## 2019-11-14 NOTE — REASON FOR VISIT
[Post Operative Visit] : a post operative visit for [Morbid Obesity (BMI>40)] : morbid obesity (bmi>40) [FreeTextEntry2] : RYGB on 10/15/2019

## 2019-11-18 PROCEDURE — 64488 TAP BLOCK BI INJECTION: CPT

## 2019-11-18 PROCEDURE — 43644 LAP GASTRIC BYPASS/ROUX-EN-Y: CPT | Mod: 82

## 2019-11-20 ENCOUNTER — OTHER (OUTPATIENT)
Age: 64
End: 2019-11-20

## 2019-11-20 RX ORDER — URSODIOL 300 MG/1
300 CAPSULE ORAL
Qty: 120 | Refills: 1 | Status: DISCONTINUED | COMMUNITY
Start: 2019-11-13 | End: 2019-11-20

## 2020-01-14 DIAGNOSIS — E56.9 VITAMIN DEFICIENCY, UNSPECIFIED: ICD-10-CM

## 2020-01-14 DIAGNOSIS — E63.9 NUTRITIONAL DEFICIENCY, UNSPECIFIED: ICD-10-CM

## 2020-01-15 ENCOUNTER — APPOINTMENT (OUTPATIENT)
Dept: SURGERY | Facility: CLINIC | Age: 65
End: 2020-01-15
Payer: MEDICARE

## 2020-01-15 VITALS — HEIGHT: 59.5 IN | WEIGHT: 252 LBS | BODY MASS INDEX: 50.13 KG/M2

## 2020-01-15 DIAGNOSIS — Z01.810 ENCOUNTER FOR PREPROCEDURAL CARDIOVASCULAR EXAMINATION: ICD-10-CM

## 2020-01-15 PROCEDURE — 99213 OFFICE O/P EST LOW 20 MIN: CPT

## 2020-01-15 RX ORDER — BIOTIN 10 MG
TABLET ORAL DAILY
Refills: 0 | Status: ACTIVE | COMMUNITY

## 2020-01-24 NOTE — DATA REVIEWED
[FreeTextEntry1] : \par Wt. change since last visit: -15.2 lbs\par %EWL: 19\par TWL: 36.2 lbs\par BMI: 50\par \par Blood work reviewed with patient. LFT's slightly elevated and will monitor. Will decrease vitamin b 12 to 3 days week. \par \par

## 2020-01-24 NOTE — HISTORY OF PRESENT ILLNESS
[Procedure: ___] : Procedure performed: [unfilled]  [Date of Surgery: ___] : Date of Surgery:   [unfilled] [Pre-Op Weight ___] : Pre-op weight was [unfilled] lbs [___ Months Post Op] : [unfilled] months [de-identified] : Patient had surgery approximately 3 months ago. \par She denies heartburn/reflux.  She had 1 episode of vomiting from Bran cereal. She denies food intolerance. She is c/o constipation and gets relief from a stool softener. She is experiencing some hair loss and added Biotin. \par Diabetes improved and she is only on Levemir.  She is not compliant with CPAP use. Discussed the risks of JESSICA and strongly encouraged compliance, back and knee pain improved.

## 2020-01-24 NOTE — ASSESSMENT
[FreeTextEntry1] : CARLENE SOLOMON is a 64 year female seen today for Bariatric follow up visit. Patient states that she feels great and is now walking without her cane. She is not at expected weight loss for surgery.\par Patient is compliant with dietary guidelines.\par She eats 3 meals/day with an occasional healthful snack. Breakfast - Greek yogurt or scrambled egg with cheese. Lunch - rolled up turkey/roast beef with mixed greens.  Dinner chicken soup, spagetti squash with softly cooked chicken or chicken salad. She eats plenty of green leafy vegetables.  Snack - sugar free Jello or sherbet. \par Fluid intake is adequate with mainly water, iced tea or coffee.\par She is walking 1-2 blocks but only when the weather is good. Recommend that she make a exercise schedule and walk 4-5 days/week starting with 15 minutes and build up to a goal of 30 minutes. Recommend adding weight bearing exercise.\par \par

## 2020-01-24 NOTE — REASON FOR VISIT
[Follow-Up Visit] : a follow-up visit for [Morbid Obesity (BMI>40)] : morbid obesity (bmi>40) [FreeTextEntry2] : RYGB on 10/15/2019

## 2020-01-24 NOTE — PLAN
[FreeTextEntry1] : PLAN: Increase exercise as recommended.\par            RTO in 6 weeks.\par            Call with concerns.

## 2020-02-28 ENCOUNTER — APPOINTMENT (OUTPATIENT)
Dept: SURGERY | Facility: CLINIC | Age: 65
End: 2020-02-28
Payer: MEDICARE

## 2020-02-28 VITALS — WEIGHT: 253 LBS | HEIGHT: 59.5 IN | BODY MASS INDEX: 50.33 KG/M2

## 2020-02-28 PROCEDURE — 99213 OFFICE O/P EST LOW 20 MIN: CPT

## 2020-02-28 RX ORDER — SIMVASTATIN 10 MG/1
10 TABLET, FILM COATED ORAL EVERY OTHER DAY
Refills: 0 | Status: ACTIVE | COMMUNITY

## 2020-02-28 NOTE — ASSESSMENT
[FreeTextEntry1] : CARLENE SOLOMON is a 64 year female seen today for Bariatric follow up visit. Patient states that she was doing well until she fell 2 weeks ago. She is not at expected weight loss for surgery.\par She eats 3 meals/day with an occasional healthful snack. Breakfast - 1 egg with broccoli blake or Greek yogurt. She sometimes would eat a 1/4 slice of toast. Lunch - green salad or spagetti squash.  Dinner - meat or chicken with vegetables or spagetti squash with a meatball. Snack - fruit. Recommend that she add a protein at each meal \par Fluid intake is adequate with mainly water and coffee.\par She was walking 3 days week for 15 minutes with her walker until 2 weeks ago. One day ago she started to use the foot pedal at home.

## 2020-02-28 NOTE — HISTORY OF PRESENT ILLNESS
[Date of Surgery: ___] : Date of Surgery:   [unfilled] [Procedure: ___] : Procedure performed: [unfilled]  [___ Months Post Op] : [unfilled] months [Pre-Op Weight ___] : Pre-op weight was [unfilled] lbs [de-identified] : Patient had surgery approximately 4 months ago. \par She denies heartburn/reflux and food intolerance. She is c/o constipation but is getting very little relief with the stool softener. Recommend adding Metamucil or MiraLax.. She is experiencing some hair loss and added Biotin. \par Diabetes improved and she is only on Levemir. She is not compliant with CPAP use. Discussed the risks of JESSICA and strongly encouraged compliance. Back and knee pain improved until recently when she fell. \par \par

## 2020-02-28 NOTE — REVIEW OF SYSTEMS
[Joint Pain] : joint pain [Joint Stiffness] : joint stiffness [Muscle Pain] : muscle pain [Negative] : Allergic/Immunologic [FreeTextEntry9] : back

## 2020-02-28 NOTE — DATA REVIEWED
[FreeTextEntry1] : \par Wt. change since last visit: +1 lb\par %EWL: 18\par TWL: 35 lbs\par BMI: 50.2\par \par

## 2020-02-28 NOTE — PLAN
[FreeTextEntry1] : PLAN: Use CPAP nightly.\par            Include proteins at each meal.\par            RTO in 6 weeks with blood work.\par            Call with concerns.

## 2020-04-15 ENCOUNTER — APPOINTMENT (OUTPATIENT)
Dept: SURGERY | Facility: CLINIC | Age: 65
End: 2020-04-15

## 2020-04-16 ENCOUNTER — APPOINTMENT (OUTPATIENT)
Dept: SURGERY | Facility: CLINIC | Age: 65
End: 2020-04-16
Payer: MEDICARE

## 2020-04-16 VITALS — BODY MASS INDEX: 48.54 KG/M2 | WEIGHT: 244 LBS | HEIGHT: 59.5 IN

## 2020-04-16 DIAGNOSIS — I73.9 PERIPHERAL VASCULAR DISEASE, UNSPECIFIED: ICD-10-CM

## 2020-04-16 DIAGNOSIS — M17.10 UNILATERAL PRIMARY OSTEOARTHRITIS, UNSPECIFIED KNEE: ICD-10-CM

## 2020-04-16 PROCEDURE — ZZZZZ: CPT

## 2020-07-15 ENCOUNTER — APPOINTMENT (OUTPATIENT)
Dept: SURGERY | Facility: CLINIC | Age: 65
End: 2020-07-15

## 2020-08-03 VITALS — WEIGHT: 234 LBS | HEIGHT: 59.5 IN | BODY MASS INDEX: 46.55 KG/M2

## 2020-08-03 RX ORDER — URSODIOL 300 MG/1
300 CAPSULE ORAL
Qty: 180 | Refills: 1 | Status: DISCONTINUED | COMMUNITY
Start: 2019-11-20 | End: 2020-08-03

## 2020-08-03 RX ORDER — DOCUSATE SODIUM 100 MG/1
100 TABLET ORAL DAILY
Refills: 0 | Status: DISCONTINUED | COMMUNITY
End: 2020-08-03

## 2020-08-03 RX ORDER — MONTELUKAST SODIUM 10 MG/1
10 TABLET, FILM COATED ORAL
Refills: 0 | Status: DISCONTINUED | COMMUNITY
End: 2020-08-03

## 2020-08-04 ENCOUNTER — APPOINTMENT (OUTPATIENT)
Dept: SURGERY | Facility: CLINIC | Age: 65
End: 2020-08-04

## 2020-09-29 ENCOUNTER — APPOINTMENT (OUTPATIENT)
Dept: SURGERY | Facility: CLINIC | Age: 65
End: 2020-09-29
Payer: MEDICARE

## 2020-09-29 DIAGNOSIS — J45.909 UNSPECIFIED ASTHMA, UNCOMPLICATED: ICD-10-CM

## 2020-09-29 DIAGNOSIS — G89.29 DORSALGIA, UNSPECIFIED: ICD-10-CM

## 2020-09-29 DIAGNOSIS — M54.9 DORSALGIA, UNSPECIFIED: ICD-10-CM

## 2020-09-29 DIAGNOSIS — G47.33 OBSTRUCTIVE SLEEP APNEA (ADULT) (PEDIATRIC): ICD-10-CM

## 2020-09-29 DIAGNOSIS — E11.8 TYPE 2 DIABETES MELLITUS WITH UNSPECIFIED COMPLICATIONS: ICD-10-CM

## 2020-09-29 DIAGNOSIS — E66.01 MORBID (SEVERE) OBESITY DUE TO EXCESS CALORIES: ICD-10-CM

## 2020-09-29 PROCEDURE — 99213 OFFICE O/P EST LOW 20 MIN: CPT

## 2020-09-29 RX ORDER — CYANOCOBALAMIN (VITAMIN B-12) 500 MCG
500 TABLET ORAL DAILY
Refills: 0 | Status: ACTIVE | COMMUNITY

## 2020-09-29 RX ORDER — MAGNESIUM OXIDE 500 MG
500 TABLET ORAL DAILY
Refills: 0 | Status: ACTIVE | COMMUNITY

## 2020-09-29 NOTE — ASSESSMENT
[FreeTextEntry1] : CARLENE SOLOMON is a 65 year female seen today for Bariatric follow up visit. Patient states that she feels great. She feels that although her weight loss is slow, she has lost several inches and has achieved better mobility. She has recently downloaded an dejan which has made her more accountable for her daily caloric intake as well as exercise.\par She eats 3 meals/day with an occasional healthful snack. Breakfast - 1 egg or Greek yogurt. Lunch - green salad or spagetti squash with 2 small slices of chicken breast. Dinner - meat or chicken with vegetables or a vegetable lasagna. Snack - fruit or a Healthy Choice fudge bar.\par Fluid intake is adequate with mainly water and coffee.\par She is walking 3 days week for 20 minutes with her walker.\par \par

## 2020-09-29 NOTE — DATA REVIEWED
[FreeTextEntry1] : Wt. change since last visit: - 10 lbs\par %EWL: 29\par TWL: 54.2 lbs\par BMI: 46\par \par \par

## 2020-09-29 NOTE — HISTORY OF PRESENT ILLNESS
[Pre-Op Weight ___] : Pre-op weight was [unfilled] lbs [de-identified] : Patient had surgery approximately 1 year ago. She had some irritation under her abdominal fold and was treated with Nystatin by her PCP.\par She denies heartburn/reflux and food intolerance. She has a bowel movement every 2 days and she remains on stool softener. Her PCP added Lactulose to her regimen but she is no longer taking the medication. Reinforced that it was not unusual for patients to have a bowel movement every few days after surgery as long as they are not having abdominal pain, pressure or any other discomfort. She is no longer experiencing hair loss but remains on Biotin. \par Diabetes improved and she is off all of her diabetes medications. She is not compliant with CPAP use. Discussed the risks of JESSICA and strongly encouraged compliance. Back and knee pain improved and she only uses her cane occasionally. \par \par  \par  [Procedure: ___] : Procedure performed: [unfilled]  [Date of Surgery: ___] : Date of Surgery:   [unfilled] [___ Years Post Op] : [unfilled] years

## 2020-09-29 NOTE — PLAN
[FreeTextEntry1] : PLAN: RTO in 6 months.\par            Blood work at next visit.\par            Call with concerns.

## 2020-11-30 NOTE — H&P PST ADULT - BP NONINVASIVE SYSTOLIC (MM HG)
Patient stated that she was trying to call but the phone number was not working she notified Dr. Manuel Bates and they gave her a different number to call and schedule the apt. She didn't have the new number with her at the time. I advice her to call us back with any questions.    142

## 2021-03-12 ENCOUNTER — TRANSCRIPTION ENCOUNTER (OUTPATIENT)
Age: 66
End: 2021-03-12

## 2022-03-25 ENCOUNTER — NON-APPOINTMENT (OUTPATIENT)
Age: 67
End: 2022-03-25

## 2022-04-05 NOTE — PHYSICAL THERAPY INITIAL EVALUATION ADULT - LEVEL OF INDEPENDENCE: SIT/STAND, REHAB EVAL
Detail Level: Detailed Detail Level: Zone Detail Level: Simple Detail Level: Generalized supervision

## 2023-05-08 NOTE — PHYSICAL THERAPY INITIAL EVALUATION ADULT - BALANCE DISTURBANCE, SYSTEM IMPAIRMENT CONTRIBUTE, REHAB EVAL
MUCINEX DM for cough and congestion. Sterile saline rinses and flonase to decongest. Tylenol or motrin for pain/fever. Salt water gargles for sore throat. Rest and drink plenty of fluids.     Follow up with your doctor for any persistent new fever, or persistent sinus pressure/congestion not improving > 10 days.You need to be seen urgently if you develop any chest pain or shortness of breath.     Your blood pressure is slightly elevated today. It was also elevated at your recent urologist appointment. Please avoid any decongestants (pseudoephedrine and phenylephrine). Blood pressure diary daily for the next 2 weeks. If persistently > 140/80, please follow up with your doctor.      musculoskeletal

## 2023-09-13 NOTE — OCCUPATIONAL THERAPY INITIAL EVALUATION ADULT - ADDITIONAL COMMENTS
pt states she has a shower chair in home prior to admit, had no difficulty with completing other ADLs and IADLs Yes

## 2023-10-03 NOTE — ED ADULT NURSE NOTE - CAS DISCH ACCOMP BY
Frederick Barrios is a 46year old male presenting for   Chief Complaint   Patient presents with   â¢ Surgical Followup     Post-op - lap appy DOS 9/20        -Patient Denies Latex allergy or symptoms of Latex sensitivity. -Medications reviewed and updated: YES  -Allergies reviewed. Social History     Tobacco Use   Smoking Status Never   Smokeless Tobacco Never     -Body mass index is 39 kg/mÂ².     Refills needed today: No Self

## 2025-07-23 NOTE — ASU PREOP CHECKLIST - AS TEMP SITE
Recent Visits  Date Type Provider Dept   07/01/25 Office Visit Ke Benson, DO [facility]   06/10/25 Office Visit Ke Benson, DO [facility]   10/16/24 Office Visit Ke Benson, DO [facility]   09/17/24 Office Visit Ke Benson, DO [facility]   Showing recent visits within past 540 days with a meds authorizing provider and meeting all other requirements  Future Appointments  Date Type Provider Dept   09/17/25 Appointment Ke Benson, DO [facility]   Showing future appointments within next 150 days with a meds authorizing provider and meeting all other requirements       oral